# Patient Record
Sex: FEMALE | NOT HISPANIC OR LATINO | Employment: UNEMPLOYED | URBAN - NONMETROPOLITAN AREA
[De-identification: names, ages, dates, MRNs, and addresses within clinical notes are randomized per-mention and may not be internally consistent; named-entity substitution may affect disease eponyms.]

---

## 2018-05-04 ENCOUNTER — HOSPITAL ENCOUNTER (EMERGENCY)
Facility: HOSPITAL | Age: 38
Discharge: HOME | End: 2018-05-04
Attending: EMERGENCY MEDICINE | Admitting: EMERGENCY MEDICINE

## 2018-05-04 ENCOUNTER — APPOINTMENT (OUTPATIENT)
Dept: GENERAL RADIOLOGY | Facility: HOSPITAL | Age: 38
End: 2018-05-04

## 2018-05-04 ENCOUNTER — APPOINTMENT (OUTPATIENT)
Dept: ULTRASOUND IMAGING | Facility: HOSPITAL | Age: 38
End: 2018-05-04

## 2018-05-04 VITALS
DIASTOLIC BLOOD PRESSURE: 75 MMHG | HEART RATE: 65 BPM | HEIGHT: 60 IN | OXYGEN SATURATION: 98 % | TEMPERATURE: 97.9 F | SYSTOLIC BLOOD PRESSURE: 120 MMHG | WEIGHT: 210.4 LBS | BODY MASS INDEX: 41.31 KG/M2 | RESPIRATION RATE: 18 BRPM

## 2018-05-04 DIAGNOSIS — M79.601 RIGHT ARM PAIN: Primary | ICD-10-CM

## 2018-05-04 LAB
ALBUMIN SERPL-MCNC: 4.4 G/DL (ref 3.5–5)
ALBUMIN/GLOB SERPL: 1.1 G/DL (ref 1–2)
ALP SERPL-CCNC: 104 U/L (ref 38–126)
ALT SERPL W P-5'-P-CCNC: 64 U/L (ref 13–69)
AMPHET+METHAMPHET UR QL: NEGATIVE
AMPHETAMINES UR QL: NEGATIVE
ANION GAP SERPL CALCULATED.3IONS-SCNC: 19.7 MMOL/L (ref 10–20)
AST SERPL-CCNC: 45 U/L (ref 15–46)
BACTERIA UR QL AUTO: ABNORMAL /HPF
BARBITURATES UR QL SCN: NEGATIVE
BASOPHILS # BLD AUTO: 0.04 10*3/MM3 (ref 0–0.2)
BASOPHILS NFR BLD AUTO: 0.3 % (ref 0–2.5)
BENZODIAZ UR QL SCN: NEGATIVE
BILIRUB SERPL-MCNC: 1.2 MG/DL (ref 0.2–1.3)
BILIRUB UR QL STRIP: NEGATIVE
BUN BLD-MCNC: 14 MG/DL (ref 7–20)
BUN/CREAT SERPL: 28 (ref 7.1–23.5)
BUPRENORPHINE SERPL-MCNC: NEGATIVE NG/ML
CALCIUM SPEC-SCNC: 8.8 MG/DL (ref 8.4–10.2)
CANNABINOIDS SERPL QL: NEGATIVE
CHLORIDE SERPL-SCNC: 102 MMOL/L (ref 98–107)
CLARITY UR: ABNORMAL
CO2 SERPL-SCNC: 23 MMOL/L (ref 26–30)
COCAINE UR QL: NEGATIVE
COLOR UR: YELLOW
CREAT BLD-MCNC: 0.5 MG/DL (ref 0.6–1.3)
CRP SERPL-MCNC: 3.9 MG/DL (ref 0–1)
DEPRECATED RDW RBC AUTO: 42.8 FL (ref 37–54)
EOSINOPHIL # BLD AUTO: 0.02 10*3/MM3 (ref 0–0.7)
EOSINOPHIL NFR BLD AUTO: 0.2 % (ref 0–7)
ERYTHROCYTE [DISTWIDTH] IN BLOOD BY AUTOMATED COUNT: 13.9 % (ref 11.5–14.5)
ERYTHROCYTE [SEDIMENTATION RATE] IN BLOOD: 58 MM/HR (ref 0–20)
GFR SERPL CREATININE-BSD FRML MDRD: 140 ML/MIN/1.73
GFR SERPL CREATININE-BSD FRML MDRD: >150 ML/MIN/1.73
GLOBULIN UR ELPH-MCNC: 3.9 GM/DL
GLUCOSE BLD-MCNC: 104 MG/DL (ref 74–98)
GLUCOSE UR STRIP-MCNC: NEGATIVE MG/DL
HCT VFR BLD AUTO: 37.8 % (ref 37–47)
HGB BLD-MCNC: 12.7 G/DL (ref 12–16)
HGB UR QL STRIP.AUTO: ABNORMAL
HYALINE CASTS UR QL AUTO: ABNORMAL /LPF
IMM GRANULOCYTES # BLD: 0.03 10*3/MM3 (ref 0–0.06)
IMM GRANULOCYTES NFR BLD: 0.2 % (ref 0–0.6)
KETONES UR QL STRIP: ABNORMAL
LEUKOCYTE ESTERASE UR QL STRIP.AUTO: NEGATIVE
LYMPHOCYTES # BLD AUTO: 2.02 10*3/MM3 (ref 0.6–3.4)
LYMPHOCYTES NFR BLD AUTO: 16.1 % (ref 10–50)
MCH RBC QN AUTO: 28.3 PG (ref 27–31)
MCHC RBC AUTO-ENTMCNC: 33.6 G/DL (ref 30–37)
MCV RBC AUTO: 84.2 FL (ref 81–99)
METHADONE UR QL SCN: NEGATIVE
MONOCYTES # BLD AUTO: 0.73 10*3/MM3 (ref 0–0.9)
MONOCYTES NFR BLD AUTO: 5.8 % (ref 0–12)
NEUTROPHILS # BLD AUTO: 9.69 10*3/MM3 (ref 2–6.9)
NEUTROPHILS NFR BLD AUTO: 77.4 % (ref 37–80)
NITRITE UR QL STRIP: NEGATIVE
NRBC BLD MANUAL-RTO: 0 /100 WBC (ref 0–0)
OPIATES UR QL: NEGATIVE
OXYCODONE UR QL SCN: NEGATIVE
PCP UR QL SCN: NEGATIVE
PH UR STRIP.AUTO: 6 [PH] (ref 5–8)
PLATELET # BLD AUTO: 303 10*3/MM3 (ref 130–400)
PMV BLD AUTO: 9 FL (ref 6–12)
POTASSIUM BLD-SCNC: 3.7 MMOL/L (ref 3.5–5.1)
PROPOXYPH UR QL: NEGATIVE
PROT SERPL-MCNC: 8.3 G/DL (ref 6.3–8.2)
PROT UR QL STRIP: NEGATIVE
RBC # BLD AUTO: 4.49 10*6/MM3 (ref 4.2–5.4)
RBC # UR: ABNORMAL /HPF
REF LAB TEST METHOD: ABNORMAL
SODIUM BLD-SCNC: 141 MMOL/L (ref 137–145)
SP GR UR STRIP: 1.02 (ref 1–1.03)
SQUAMOUS #/AREA URNS HPF: ABNORMAL /HPF
TRICYCLICS UR QL SCN: NEGATIVE
URATE SERPL-MCNC: 5 MG/DL (ref 2.5–8.5)
UROBILINOGEN UR QL STRIP: ABNORMAL
WBC NRBC COR # BLD: 12.53 10*3/MM3 (ref 4.8–10.8)
WBC UR QL AUTO: ABNORMAL /HPF

## 2018-05-04 PROCEDURE — 85025 COMPLETE CBC W/AUTO DIFF WBC: CPT | Performed by: PHYSICIAN ASSISTANT

## 2018-05-04 PROCEDURE — 99284 EMERGENCY DEPT VISIT MOD MDM: CPT

## 2018-05-04 PROCEDURE — 87086 URINE CULTURE/COLONY COUNT: CPT | Performed by: PHYSICIAN ASSISTANT

## 2018-05-04 PROCEDURE — 81001 URINALYSIS AUTO W/SCOPE: CPT | Performed by: PHYSICIAN ASSISTANT

## 2018-05-04 PROCEDURE — 73080 X-RAY EXAM OF ELBOW: CPT

## 2018-05-04 PROCEDURE — 80306 DRUG TEST PRSMV INSTRMNT: CPT | Performed by: PHYSICIAN ASSISTANT

## 2018-05-04 PROCEDURE — 86140 C-REACTIVE PROTEIN: CPT | Performed by: PHYSICIAN ASSISTANT

## 2018-05-04 PROCEDURE — 25010000002 KETOROLAC TROMETHAMINE PER 15 MG: Performed by: PHYSICIAN ASSISTANT

## 2018-05-04 PROCEDURE — 85651 RBC SED RATE NONAUTOMATED: CPT | Performed by: PHYSICIAN ASSISTANT

## 2018-05-04 PROCEDURE — 93971 EXTREMITY STUDY: CPT

## 2018-05-04 PROCEDURE — 96372 THER/PROPH/DIAG INJ SC/IM: CPT

## 2018-05-04 PROCEDURE — 80053 COMPREHEN METABOLIC PANEL: CPT | Performed by: PHYSICIAN ASSISTANT

## 2018-05-04 PROCEDURE — 84550 ASSAY OF BLOOD/URIC ACID: CPT | Performed by: PHYSICIAN ASSISTANT

## 2018-05-04 RX ORDER — KETOROLAC TROMETHAMINE 30 MG/ML
60 INJECTION, SOLUTION INTRAMUSCULAR; INTRAVENOUS ONCE
Status: COMPLETED | OUTPATIENT
Start: 2018-05-04 | End: 2018-05-04

## 2018-05-04 RX ORDER — METHYLPREDNISOLONE SODIUM SUCCINATE 125 MG/2ML
125 INJECTION, POWDER, LYOPHILIZED, FOR SOLUTION INTRAMUSCULAR; INTRAVENOUS ONCE
Status: DISCONTINUED | OUTPATIENT
Start: 2018-05-04 | End: 2018-05-04

## 2018-05-04 RX ORDER — PREDNISONE 20 MG/1
20 TABLET ORAL DAILY
Qty: 5 TABLET | Refills: 0 | Status: SHIPPED | OUTPATIENT
Start: 2018-05-04 | End: 2022-03-30

## 2018-05-04 RX ORDER — ORPHENADRINE CITRATE 100 MG/1
100 TABLET, EXTENDED RELEASE ORAL 2 TIMES DAILY PRN
Qty: 10 TABLET | Refills: 0 | Status: SHIPPED | OUTPATIENT
Start: 2018-05-04 | End: 2022-03-30

## 2018-05-04 RX ADMIN — KETOROLAC TROMETHAMINE 60 MG: 30 INJECTION, SOLUTION INTRAMUSCULAR at 19:02

## 2018-05-04 NOTE — ED PROVIDER NOTES
Subjective   This is a 37-year-old female comes in with chief complaint of right upper extremity pain, swelling.  Patient denies any recent injury or trauma.  Patient was sent up by her primary care physician to have further evaluation and treatment.  Patient complains of aching, throbbing pain starts in her right elbow and radiates up to her upper arm.  Patient denies an previous injury.  Patient denies any associated chest pain, shortness of breath.          History provided by:  Patient   used: No    Upper Extremity Issue   Location:  Arm  Arm location:  R upper arm and R arm  Injury: no    Pain details:     Quality:  Aching and throbbing    Radiates to:  Does not radiate    Severity:  Moderate    Onset quality:  Sudden    Duration:  1 day    Timing:  Intermittent    Progression:  Worsening  Dislocation: no    Tetanus status:  Unknown  Prior injury to area:  No  Relieved by:  Nothing  Worsened by:  Nothing  Ineffective treatments:  None tried  Associated symptoms: swelling    Associated symptoms: no back pain, no decreased range of motion and no fatigue    Risk factors: no concern for non-accidental trauma and no frequent fractures        Review of Systems   Constitutional: Negative for diaphoresis and fatigue.   Respiratory: Negative for chest tightness.    Musculoskeletal: Positive for arthralgias, joint swelling and myalgias. Negative for back pain.   All other systems reviewed and are negative.      History reviewed. No pertinent past medical history.    No Known Allergies    History reviewed. No pertinent surgical history.    History reviewed. No pertinent family history.    Social History     Social History   • Marital status: Single     Social History Main Topics   • Smoking status: Never Smoker   • Alcohol use No   • Drug use: No     Other Topics Concern   • Not on file           Objective   Physical Exam   Constitutional: She is oriented to person, place, and time. She appears  well-developed and well-nourished.   HENT:   Head: Normocephalic.   Right Ear: External ear normal.   Left Ear: External ear normal.   Nose: Nose normal.   Mouth/Throat: Oropharynx is clear and moist.   Eyes: Conjunctivae and EOM are normal. Pupils are equal, round, and reactive to light.   Neck: Normal range of motion. Neck supple.   Cardiovascular: Normal rate, regular rhythm, normal heart sounds and intact distal pulses.  Exam reveals no friction rub.    No murmur heard.  Pulmonary/Chest: Effort normal and breath sounds normal. No respiratory distress. She has no wheezes.   Abdominal: Soft. Bowel sounds are normal. She exhibits no distension. There is no tenderness.   Musculoskeletal: She exhibits edema and tenderness.        Right forearm: She exhibits tenderness, swelling and edema.   Neurological: She is alert and oriented to person, place, and time. She has normal reflexes.   Skin: Skin is warm and dry. Capillary refill takes less than 2 seconds.   Psychiatric: She has a normal mood and affect. Her behavior is normal. Judgment and thought content normal.   Nursing note and vitals reviewed.      Procedures           ED Course  ED Course   Comment By Time   Discussed care with patient. Patient does state that she feels better at this time. Will treat patient will oral steroids, anti-inflammatory. Advised to return if condition worsens.  Pelon Constantino PA-C 05/04 1926                  [facility]      Final diagnoses:   Right arm pain            Pelon Constantino PA-C  05/04/18 [id number]

## 2018-05-04 NOTE — ED NOTES
Spoke with Gabriel () [id number].  Patient reports of pain that began last night. Pain and swelling worse since this am.  Went to [facility] and saw Dr. Morris and was instructed to come here for evaluation.       Gladis Mary RN  05/04/18 [id number]

## 2018-05-06 LAB — BACTERIA SPEC AEROBE CULT: NORMAL

## 2020-10-13 ENCOUNTER — TRANSCRIBE ORDERS (OUTPATIENT)
Dept: ADMINISTRATIVE | Facility: HOSPITAL | Age: 40
End: 2020-10-13

## 2020-10-13 DIAGNOSIS — M54.9 DORSALGIA: Primary | ICD-10-CM

## 2020-10-16 ENCOUNTER — HOSPITAL ENCOUNTER (OUTPATIENT)
Dept: CT IMAGING | Facility: HOSPITAL | Age: 40
Discharge: HOME | End: 2020-10-16
Admitting: NURSE PRACTITIONER

## 2020-10-16 DIAGNOSIS — M54.9 DORSALGIA: ICD-10-CM

## 2020-10-16 PROCEDURE — 74176 CT ABD & PELVIS W/O CONTRAST: CPT

## 2020-10-27 ENCOUNTER — TRANSCRIBE ORDERS (OUTPATIENT)
Dept: ADMINISTRATIVE | Facility: HOSPITAL | Age: 40
End: 2020-10-27

## 2020-10-27 DIAGNOSIS — N28.89 LEFT KIDNEY MASS: Primary | ICD-10-CM

## 2020-11-10 ENCOUNTER — HOSPITAL ENCOUNTER (OUTPATIENT)
Dept: CT IMAGING | Facility: HOSPITAL | Age: 40
Discharge: HOME | End: 2020-11-10
Admitting: NURSE PRACTITIONER

## 2020-11-10 DIAGNOSIS — N28.89 LEFT KIDNEY MASS: ICD-10-CM

## 2020-11-10 LAB — CREAT BLDA-MCNC: 0.5 MG/DL (ref 0.6–1.3)

## 2020-11-10 PROCEDURE — 74178 CT ABD&PLV WO CNTR FLWD CNTR: CPT

## 2020-11-10 PROCEDURE — 82565 ASSAY OF CREATININE: CPT

## 2020-11-10 PROCEDURE — 25010000002 IOPAMIDOL 61 % SOLUTION: Performed by: NURSE PRACTITIONER

## 2020-11-10 RX ADMIN — IOPAMIDOL 100 ML: 612 INJECTION, SOLUTION INTRAVENOUS at 09:23

## 2021-01-23 ENCOUNTER — APPOINTMENT (OUTPATIENT)
Dept: CT IMAGING | Facility: HOSPITAL | Age: 41
End: 2021-01-23

## 2021-01-23 ENCOUNTER — HOSPITAL ENCOUNTER (EMERGENCY)
Facility: HOSPITAL | Age: 41
Discharge: HOME | End: 2021-01-23
Attending: EMERGENCY MEDICINE | Admitting: EMERGENCY MEDICINE

## 2021-01-23 VITALS
RESPIRATION RATE: 18 BRPM | OXYGEN SATURATION: 95 % | BODY MASS INDEX: 39.05 KG/M2 | SYSTOLIC BLOOD PRESSURE: 118 MMHG | TEMPERATURE: 100.5 F | HEIGHT: 63 IN | WEIGHT: 220.4 LBS | HEART RATE: 78 BPM | DIASTOLIC BLOOD PRESSURE: 76 MMHG

## 2021-01-23 DIAGNOSIS — U07.1 COVID-19: Primary | ICD-10-CM

## 2021-01-23 DIAGNOSIS — U07.1 PNEUMONIA DUE TO COVID-19 VIRUS: ICD-10-CM

## 2021-01-23 DIAGNOSIS — J12.82 PNEUMONIA DUE TO COVID-19 VIRUS: ICD-10-CM

## 2021-01-23 DIAGNOSIS — R10.31 RIGHT LOWER QUADRANT ABDOMINAL PAIN: ICD-10-CM

## 2021-01-23 LAB
ALBUMIN SERPL-MCNC: 4.1 G/DL (ref 3.5–5.2)
ALBUMIN/GLOB SERPL: 1.1 G/DL
ALP SERPL-CCNC: 100 U/L (ref 39–117)
ALT SERPL W P-5'-P-CCNC: 77 U/L (ref 1–33)
ANION GAP SERPL CALCULATED.3IONS-SCNC: 10.6 MMOL/L (ref 5–15)
AST SERPL-CCNC: 69 U/L (ref 1–32)
B-HCG UR QL: NEGATIVE
BASOPHILS # BLD AUTO: 0.02 10*3/MM3 (ref 0–0.2)
BASOPHILS NFR BLD AUTO: 0.3 % (ref 0–1.5)
BILIRUB SERPL-MCNC: 0.4 MG/DL (ref 0–1.2)
BILIRUB UR QL STRIP: NEGATIVE
BUN SERPL-MCNC: 7 MG/DL (ref 6–20)
BUN/CREAT SERPL: 10.6 (ref 7–25)
CALCIUM SPEC-SCNC: 8.6 MG/DL (ref 8.6–10.5)
CHLORIDE SERPL-SCNC: 99 MMOL/L (ref 98–107)
CLARITY UR: CLEAR
CO2 SERPL-SCNC: 25.4 MMOL/L (ref 22–29)
COLOR UR: YELLOW
CREAT SERPL-MCNC: 0.66 MG/DL (ref 0.57–1)
DEPRECATED RDW RBC AUTO: 40.5 FL (ref 37–54)
EOSINOPHIL # BLD AUTO: 0.04 10*3/MM3 (ref 0–0.4)
EOSINOPHIL NFR BLD AUTO: 0.6 % (ref 0.3–6.2)
ERYTHROCYTE [DISTWIDTH] IN BLOOD BY AUTOMATED COUNT: 13.3 % (ref 12.3–15.4)
GFR SERPL CREATININE-BSD FRML MDRD: 100 ML/MIN/1.73
GFR SERPL CREATININE-BSD FRML MDRD: 121 ML/MIN/1.73
GLOBULIN UR ELPH-MCNC: 3.7 GM/DL
GLUCOSE SERPL-MCNC: 125 MG/DL (ref 65–99)
GLUCOSE UR STRIP-MCNC: NEGATIVE MG/DL
HCT VFR BLD AUTO: 43.9 % (ref 34–46.6)
HGB BLD-MCNC: 14.5 G/DL (ref 12–15.9)
HGB UR QL STRIP.AUTO: NEGATIVE
HOLD SPECIMEN: NORMAL
HOLD SPECIMEN: NORMAL
IMM GRANULOCYTES # BLD AUTO: 0.02 10*3/MM3 (ref 0–0.05)
IMM GRANULOCYTES NFR BLD AUTO: 0.3 % (ref 0–0.5)
KETONES UR QL STRIP: NEGATIVE
LEUKOCYTE ESTERASE UR QL STRIP.AUTO: NEGATIVE
LIPASE SERPL-CCNC: 18 U/L (ref 13–60)
LYMPHOCYTES # BLD AUTO: 2.09 10*3/MM3 (ref 0.7–3.1)
LYMPHOCYTES NFR BLD AUTO: 31.7 % (ref 19.6–45.3)
MCH RBC QN AUTO: 27.4 PG (ref 26.6–33)
MCHC RBC AUTO-ENTMCNC: 33 G/DL (ref 31.5–35.7)
MCV RBC AUTO: 83 FL (ref 79–97)
MONOCYTES # BLD AUTO: 0.57 10*3/MM3 (ref 0.1–0.9)
MONOCYTES NFR BLD AUTO: 8.6 % (ref 5–12)
NEUTROPHILS NFR BLD AUTO: 3.85 10*3/MM3 (ref 1.7–7)
NEUTROPHILS NFR BLD AUTO: 58.5 % (ref 42.7–76)
NITRITE UR QL STRIP: NEGATIVE
NRBC BLD AUTO-RTO: 0 /100 WBC (ref 0–0.2)
PH UR STRIP.AUTO: 6.5 [PH] (ref 5–8)
PLATELET # BLD AUTO: 219 10*3/MM3 (ref 140–450)
PMV BLD AUTO: 9 FL (ref 6–12)
POTASSIUM SERPL-SCNC: 3.5 MMOL/L (ref 3.5–5.2)
PROT SERPL-MCNC: 7.8 G/DL (ref 6–8.5)
PROT UR QL STRIP: NEGATIVE
RBC # BLD AUTO: 5.29 10*6/MM3 (ref 3.77–5.28)
SODIUM SERPL-SCNC: 135 MMOL/L (ref 136–145)
SP GR UR STRIP: 1.01 (ref 1–1.03)
UROBILINOGEN UR QL STRIP: NORMAL
WBC # BLD AUTO: 6.59 10*3/MM3 (ref 3.4–10.8)
WHOLE BLOOD HOLD SPECIMEN: NORMAL
WHOLE BLOOD HOLD SPECIMEN: NORMAL

## 2021-01-23 PROCEDURE — 25010000002 ONDANSETRON PER 1 MG: Performed by: EMERGENCY MEDICINE

## 2021-01-23 PROCEDURE — 83690 ASSAY OF LIPASE: CPT | Performed by: EMERGENCY MEDICINE

## 2021-01-23 PROCEDURE — 80053 COMPREHEN METABOLIC PANEL: CPT | Performed by: EMERGENCY MEDICINE

## 2021-01-23 PROCEDURE — 25010000002 IOPAMIDOL 61 % SOLUTION: Performed by: EMERGENCY MEDICINE

## 2021-01-23 PROCEDURE — 85025 COMPLETE CBC W/AUTO DIFF WBC: CPT | Performed by: EMERGENCY MEDICINE

## 2021-01-23 PROCEDURE — 81003 URINALYSIS AUTO W/O SCOPE: CPT | Performed by: EMERGENCY MEDICINE

## 2021-01-23 PROCEDURE — 74177 CT ABD & PELVIS W/CONTRAST: CPT

## 2021-01-23 PROCEDURE — 81025 URINE PREGNANCY TEST: CPT | Performed by: EMERGENCY MEDICINE

## 2021-01-23 PROCEDURE — 99283 EMERGENCY DEPT VISIT LOW MDM: CPT

## 2021-01-23 PROCEDURE — 25010000002 KETOROLAC TROMETHAMINE PER 15 MG: Performed by: EMERGENCY MEDICINE

## 2021-01-23 PROCEDURE — 96375 TX/PRO/DX INJ NEW DRUG ADDON: CPT

## 2021-01-23 PROCEDURE — 96374 THER/PROPH/DIAG INJ IV PUSH: CPT

## 2021-01-23 RX ORDER — ONDANSETRON 4 MG/1
4 TABLET, ORALLY DISINTEGRATING ORAL EVERY 8 HOURS PRN
Qty: 12 TABLET | Refills: 0 | Status: SHIPPED | OUTPATIENT
Start: 2021-01-23 | End: 2021-01-23 | Stop reason: SDUPTHER

## 2021-01-23 RX ORDER — AZITHROMYCIN 250 MG/1
TABLET, FILM COATED ORAL
Qty: 6 TABLET | Refills: 0 | Status: SHIPPED | OUTPATIENT
Start: 2021-01-23 | End: 2022-03-30

## 2021-01-23 RX ORDER — SODIUM CHLORIDE 0.9 % (FLUSH) 0.9 %
10 SYRINGE (ML) INJECTION AS NEEDED
Status: DISCONTINUED | OUTPATIENT
Start: 2021-01-23 | End: 2021-01-23 | Stop reason: HOSPADM

## 2021-01-23 RX ORDER — ONDANSETRON 4 MG/1
4 TABLET, ORALLY DISINTEGRATING ORAL EVERY 8 HOURS PRN
Qty: 12 TABLET | Refills: 0 | Status: SHIPPED | OUTPATIENT
Start: 2021-01-23 | End: 2022-03-30

## 2021-01-23 RX ORDER — ONDANSETRON 2 MG/ML
4 INJECTION INTRAMUSCULAR; INTRAVENOUS ONCE
Status: COMPLETED | OUTPATIENT
Start: 2021-01-23 | End: 2021-01-23

## 2021-01-23 RX ORDER — AZITHROMYCIN 250 MG/1
TABLET, FILM COATED ORAL
Qty: 6 TABLET | Refills: 0 | Status: SHIPPED | OUTPATIENT
Start: 2021-01-23 | End: 2021-01-23 | Stop reason: SDUPTHER

## 2021-01-23 RX ORDER — KETOROLAC TROMETHAMINE 30 MG/ML
30 INJECTION, SOLUTION INTRAMUSCULAR; INTRAVENOUS EVERY 6 HOURS PRN
Status: DISCONTINUED | OUTPATIENT
Start: 2021-01-23 | End: 2021-01-23 | Stop reason: HOSPADM

## 2021-01-23 RX ORDER — PREDNISONE 20 MG/1
TABLET ORAL
Qty: 10 TABLET | Refills: 0 | Status: SHIPPED | OUTPATIENT
Start: 2021-01-23 | End: 2021-01-23 | Stop reason: SDUPTHER

## 2021-01-23 RX ORDER — PREDNISONE 20 MG/1
TABLET ORAL
Qty: 10 TABLET | Refills: 0 | Status: SHIPPED | OUTPATIENT
Start: 2021-01-23 | End: 2022-03-30

## 2021-01-23 RX ADMIN — ONDANSETRON 4 MG: 2 INJECTION INTRAMUSCULAR; INTRAVENOUS at 01:18

## 2021-01-23 RX ADMIN — KETOROLAC TROMETHAMINE 30 MG: 30 INJECTION, SOLUTION INTRAMUSCULAR at 01:19

## 2021-01-23 RX ADMIN — IOPAMIDOL 100 ML: 612 INJECTION, SOLUTION INTRAVENOUS at 01:51

## 2021-03-04 ENCOUNTER — OFFICE VISIT (OUTPATIENT)
Dept: UROLOGY | Facility: CLINIC | Age: 41
End: 2021-03-04

## 2021-03-04 VITALS
HEART RATE: 91 BPM | WEIGHT: 220 LBS | HEIGHT: 63 IN | BODY MASS INDEX: 38.98 KG/M2 | OXYGEN SATURATION: 97 % | DIASTOLIC BLOOD PRESSURE: 90 MMHG | SYSTOLIC BLOOD PRESSURE: 124 MMHG | RESPIRATION RATE: 18 BRPM

## 2021-03-04 DIAGNOSIS — N28.1 RENAL CYST: Primary | ICD-10-CM

## 2021-03-04 LAB
BILIRUB BLD-MCNC: NEGATIVE MG/DL
CLARITY, POC: CLEAR
COLOR UR: YELLOW
GLUCOSE UR STRIP-MCNC: NEGATIVE MG/DL
KETONES UR QL: NEGATIVE
LEUKOCYTE EST, POC: NEGATIVE
NITRITE UR-MCNC: NEGATIVE MG/ML
PH UR: 7 [PH] (ref 5–8)
PROT UR STRIP-MCNC: NEGATIVE MG/DL
RBC # UR STRIP: NEGATIVE /UL
SP GR UR: 1.02 (ref 1–1.03)
UROBILINOGEN UR QL: NORMAL

## 2021-03-04 PROCEDURE — 99203 OFFICE O/P NEW LOW 30 MIN: CPT | Performed by: UROLOGY

## 2021-03-04 NOTE — PROGRESS NOTES
Chief Complaint  No chief complaint on file.    Referring Provider:  Shiva Neumann MD, FASN    HPI  Ms. Forte is a 40 y.o. female with below past medical history who presents with abnormal CT imaging. She recently presented to the emergency department on 1/23/2021 with a several day history of intermittent, severe, and very bothersome left lower quadrant pain. It was not associated with fevers, flank pain, or gross hematuria. She denies a history of nephrolithiasis. She was incidentally found to have a cyst on her left kidney and presents today for consultation regarding that cyst.    The conversation was held primarily in Marshallese with the additional aid of a .    Past Medical History  No past medical history on file.    Past Surgical History  No past surgical history on file.    Medications    Current Outpatient Medications:   •  azithromycin (ZITHROMAX) 250 MG tablet, Take 2 tablets the first day, then 1 tablet daily for 4 days., Disp: 6 tablet, Rfl: 0  •  ondansetron ODT (ZOFRAN-ODT) 4 MG disintegrating tablet, Place 1 tablet on the tongue Every 8 (Eight) Hours As Needed for Nausea., Disp: 12 tablet, Rfl: 0  •  orphenadrine (NORFLEX) 100 MG 12 hr tablet, Take 1 tablet by mouth 2 (Two) Times a Day As Needed for Muscle Spasms., Disp: 10 tablet, Rfl: 0  •  predniSONE (DELTASONE) 20 MG tablet, Take 1 tablet by mouth Daily., Disp: 5 tablet, Rfl: 0  •  predniSONE (DELTASONE) 20 MG tablet, Take 2 tablets daily., Disp: 10 tablet, Rfl: 0    Allergies  No Known Allergies    Social History  Social History     Socioeconomic History   • Marital status: Single     Spouse name: Not on file   • Number of children: Not on file   • Years of education: Not on file   • Highest education level: Not on file   Tobacco Use   • Smoking status: Never Smoker   • Smokeless tobacco: Never Used   Substance and Sexual Activity   • Alcohol use: No   • Drug use: No       Family History  She has no family history of  "renal cell cancer.    Review of Systems  Review of systems was notable for resolved left lower quadrant pain.    Physical Exam  Visit Vitals  /90   Pulse 91   Resp 18   Ht 160 cm (62.99\")   Wt 99.8 kg (220 lb)   SpO2 97%   BMI 38.98 kg/m²     Physical exam was notable for obesity and no CVA tenderness.    Labs  Brief Urine Lab Results  (Last result in the past 365 days)      Color   Clarity   Blood   Leuk Est   Nitrite   Protein   CREAT   Urine HCG        03/04/21 1518 Yellow Clear Negative Negative Negative Negative                    Lab Results   Component Value Date    GLUCOSE 125 (H) 01/23/2021    CALCIUM 8.6 01/23/2021     (L) 01/23/2021    K 3.5 01/23/2021    CO2 25.4 01/23/2021    CL 99 01/23/2021    BUN 7 01/23/2021    CREATININE 0.66 01/23/2021    EGFRIFAFRI 121 01/23/2021    EGFRIFNONA 100 01/23/2021    BCR 10.6 01/23/2021    ANIONGAP 10.6 01/23/2021       Lab Results   Component Value Date    WBC 6.59 01/23/2021    HGB 14.5 01/23/2021    HCT 43.9 01/23/2021    MCV 83.0 01/23/2021     01/23/2021         Radiographic Studies  Ct Abdomen Pelvis With & Without Contrast  Result Date: 11/10/2020  1. Exam is degraded by artifact from patient motion. 2. Left renal lesion is somewhat difficult to definitively characterize due to the patient motion artifact. There is only questionable mild enhancement. This may represent a complex cyst. Other neoplastic process difficult to definitively exclude. Urologic consultation is recommended for appropriate continued follow-up. This could be further characterized with MRI or a short-term repeat follow-up renal mass protocol CT within 3-4 months.  This report was finalized on 11/10/2020 10:52 AM by Clark Ellis MD.    Ct Abdomen Pelvis With Contrast  Result Date: 1/23/2021  Impression: 1.  No acute appendicitis or other acute inflammatory process. 2.  No acute obstructive uropathy.  Probable 4 cm hyperdense left renal cyst.  Follow-up renal ultrasound or " dedicated renal CT could be performed on a nonurgent elective basis. 3.  Multiple pulmonary opacities in the included lung fields most suggestive of infectious process.  Recommend clinical correlation. 4.  No bowel obstruction or ascites. 5.  Probable 2 cm left ovarian dominant follicle/small cyst. Authenticated by Candice Virk DO on 01/23/2021 02:23:14 AM        Assessment  Ms. Forte is a 40 y.o. female with a likely proteinaceous/complex renal cyst on her left kidney. While there appears to be hyper-dense material within this cyst on non-contrast imaging, it overall does not seem to enhance and therefore is likely benign. We will continue to follow it with repeat imaging in 6 months. This is a new diagnosis of uncertain clinical significance at this point.    Plan  1. Follow up in 6 months with CT renal mass protocol imaging.    Scribed for Romel Broussard MD by DANNY PLAZA.  3/5/2021  12:05 EST    ALEAXNDRA Broussard MD have personally performed the services described in this document as scribed by the above individual, and it is both accurate and complete.     Romel Broussard MD  3/8/2021  07:40 EST      Romel Broussard MD

## 2021-09-15 ENCOUNTER — HOSPITAL ENCOUNTER (OUTPATIENT)
Dept: CT IMAGING | Facility: HOSPITAL | Age: 41
Discharge: HOME | End: 2021-09-15
Admitting: UROLOGY

## 2021-09-15 DIAGNOSIS — N28.1 RENAL CYST: ICD-10-CM

## 2021-09-15 PROCEDURE — 25010000002 IOPAMIDOL 61 % SOLUTION: Performed by: UROLOGY

## 2021-09-15 PROCEDURE — 74170 CT ABD WO CNTRST FLWD CNTRST: CPT

## 2021-09-15 RX ADMIN — IOPAMIDOL 100 ML: 612 INJECTION, SOLUTION INTRAVENOUS at 11:52

## 2021-09-24 ENCOUNTER — OFFICE VISIT (OUTPATIENT)
Dept: UROLOGY | Facility: CLINIC | Age: 41
End: 2021-09-24

## 2021-09-24 VITALS
HEIGHT: 63 IN | TEMPERATURE: 97.1 F | WEIGHT: 220 LBS | BODY MASS INDEX: 38.98 KG/M2 | DIASTOLIC BLOOD PRESSURE: 68 MMHG | HEART RATE: 61 BPM | SYSTOLIC BLOOD PRESSURE: 110 MMHG | OXYGEN SATURATION: 97 %

## 2021-09-24 DIAGNOSIS — N28.1 RENAL CYST: Primary | ICD-10-CM

## 2021-09-24 PROBLEM — N28.89 RENAL MASS: Status: ACTIVE | Noted: 2021-09-24

## 2021-09-24 PROCEDURE — 99214 OFFICE O/P EST MOD 30 MIN: CPT | Performed by: UROLOGY

## 2021-09-24 RX ORDER — SODIUM CHLORIDE, SODIUM LACTATE, POTASSIUM CHLORIDE, CALCIUM CHLORIDE 600; 310; 30; 20 MG/100ML; MG/100ML; MG/100ML; MG/100ML
100 INJECTION, SOLUTION INTRAVENOUS CONTINUOUS
Status: CANCELLED | OUTPATIENT
Start: 2021-09-24

## 2021-09-24 NOTE — PROGRESS NOTES
"Chief Complaint   Patient presents with   • Follow-up     6 Month follow up for CT scan results     HPI  Ms. Verma is a 41 y.o. female with history below in assessment, who presents for follow up.     At this visit she has no flank pain.    History reviewed. No pertinent past medical history.    History reviewed. No pertinent surgical history.      Current Outpatient Medications:   •  azithromycin (ZITHROMAX) 250 MG tablet, Take 2 tablets the first day, then 1 tablet daily for 4 days., Disp: 6 tablet, Rfl: 0  •  ondansetron ODT (ZOFRAN-ODT) 4 MG disintegrating tablet, Place 1 tablet on the tongue Every 8 (Eight) Hours As Needed for Nausea., Disp: 12 tablet, Rfl: 0  •  orphenadrine (NORFLEX) 100 MG 12 hr tablet, Take 1 tablet by mouth 2 (Two) Times a Day As Needed for Muscle Spasms., Disp: 10 tablet, Rfl: 0  •  predniSONE (DELTASONE) 20 MG tablet, Take 1 tablet by mouth Daily., Disp: 5 tablet, Rfl: 0  •  predniSONE (DELTASONE) 20 MG tablet, Take 2 tablets daily., Disp: 10 tablet, Rfl: 0     Physical Exam  Visit Vitals  /68   Pulse 61   Temp 97.1 °F (36.2 °C)   Ht 160 cm (63\")   Wt 99.8 kg (220 lb)   SpO2 97%   Breastfeeding No   BMI 38.97 kg/m²       Labs  Brief Urine Lab Results  (Last result in the past 365 days)      Color   Clarity   Blood   Leuk Est   Nitrite   Protein   CREAT   Urine HCG        03/04/21 1518 Yellow Clear Negative Negative Negative Negative               Lab Results   Component Value Date    GLUCOSE 125 (H) 01/23/2021    CALCIUM 8.6 01/23/2021     (L) 01/23/2021    K 3.5 01/23/2021    CO2 25.4 01/23/2021    CL 99 01/23/2021    BUN 7 01/23/2021    CREATININE 0.66 01/23/2021    EGFRIFAFRI 121 01/23/2021    EGFRIFNONA 100 01/23/2021    BCR 10.6 01/23/2021    ANIONGAP 10.6 01/23/2021       Lab Results   Component Value Date    WBC 6.59 01/23/2021    HGB 14.5 01/23/2021    HCT 43.9 01/23/2021    MCV 83.0 01/23/2021     01/23/2021       Radiographic Studies  CT Abdomen With " & Without Contrast  Result Date: 9/15/2021  Interval increase in size of the cystic lesion in the mid left kidney with enhancing internal septations. A cystic renal cell carcinoma is not excluded with this exam.  3423.22 mGy.cm   This study was performed with techniques to keep radiation doses as low as reasonably achievable (ALARA). Individualized dose reduction techniques using automated exposure control or adjustment of mA and/or kV according to the patient size were employed.  This report was finalized on 9/15/2021 12:16 PM by Regina Peter M.D..      I have reviewed the above labs and imaging.     Assessment  41 y.o. female with 5cm growing left renal mass. It has more characteristics that that appeared malignant as well.    We had a long discussion with the aid of the  about the different options to treat renal masses.  We discussed renal breast biopsy with its pros and cons.  I explained to her that because this is a cystic mass the chance of having an inconclusive biopsy was higher.  We discussed radical versus partial nephrectomy.  The mass is not well-circumscribed and appears to be involving the collecting system, placing her at greater operative risk.  She has few medical comorbidities other than wheezing and would tolerate loss of the kidney well.  She does not have any CKD.    Plan  1. Schedule left lap nephrectomy at [facility].  Risk factors associated with complications are obesity.

## 2021-10-14 ENCOUNTER — TRANSCRIBE ORDERS (OUTPATIENT)
Dept: ADMINISTRATIVE | Facility: HOSPITAL | Age: 41
End: 2021-10-14

## 2021-10-14 DIAGNOSIS — Z11.59 ENCOUNTER FOR SCREENING FOR VIRAL DISEASE: Primary | ICD-10-CM

## 2021-11-17 ENCOUNTER — APPOINTMENT (OUTPATIENT)
Dept: PREADMISSION TESTING | Facility: HOSPITAL | Age: 41
End: 2021-11-17

## 2021-11-22 ENCOUNTER — PRE-ADMISSION TESTING (OUTPATIENT)
Dept: PREADMISSION TESTING | Facility: HOSPITAL | Age: 41
End: 2021-11-22

## 2021-11-22 ENCOUNTER — TELEPHONE (OUTPATIENT)
Dept: UROLOGY | Facility: CLINIC | Age: 41
End: 2021-11-22

## 2021-11-22 NOTE — DISCHARGE INSTRUCTIONS
PAT PASS GIVEN/REVIEWED WITH PT.  VERBALIZED UNDERSTANDING OF THE FOLLOWING:  DO NOT EAT, DRINK, SMOKE, USE SMOKELESS TOBACCO OR CHEW GUM AFTER MIDNIGHT THE NIGHT BEFORE SURGERY.  THIS ALSO INCLUDES HARD CANDIES AND MINTS.    DO NOT SHAVE THE AREA TO BE OPERATED ON AT LEAST 48 HOURS PRIOR TO THE PROCEDURE.  DO NOT WEAR MAKE UP OR NAIL POLISH.  DO NOT LEAVE IN ANY PIERCING OR WEAR JEWELRY THE DAY OF SURGERY.      DO NOT USE ADHESIVES IF YOU WEAR DENTURES.    DO NOT WEAR EYE CONTACTS; BRING IN YOUR GLASSES.    ONLY TAKE MEDICATION THE MORNING OF YOUR PROCEDURE IF INSTRUCTED BY YOUR SURGEON WITH ENOUGH WATER TO SWALLOW THE MEDICATION.  IF YOUR SURGEON DID NOT SPECIFY WHICH MEDICATIONS TO TAKE, YOU WILL NEED TO CALL THEIR OFFICE FOR FURTHER INSTRUCTIONS AND DO AS THEY INSTRUCT.    LEAVE ANYTHING YOU CONSIDER VALUABLE AT HOME.    YOU WILL NEED TO ARRANGE FOR SOMEONE TO DRIVE YOU HOME AFTER SURGERY.  IT IS RECOMMENDED THAT YOU DO NOT DRIVE, WORK, DRINK ALCOHOL OR MAKE MAJOR DECISIONS FOR AT LEAST 24 HOURS AFTER YOUR PROCEDURE IS COMPLETE.      THE DAY OF YOUR PROCEDURE, BRING IN THE FOLLOWING IF APPLICABLE:   PICTURE ID AND INSURANCE/MEDICARE OR MEDICAID CARDS/ANY CO-PAY THAT MAY BE DUE   COPY OF ADVANCED DIRECTIVE/LIVING WILL/POWER OR    CPAP/BIPAP/INHALERS   SKIN PREP SHEET   YOUR PREADMISSION TESTING PASS (IF NOT A PHONE HISTORY)        Chlorhexidine wipes along with instruction/verification sheet given to pt.  Instructed pt to date, time, and initial the verification sheet once skin prep has been  completed, and [facility].  Pt. Verbalizes understanding.      COVID self-quarantine instructions reviewed with the pt.  Verbalized understanding.

## 2021-11-22 NOTE — PAT
Informed pt via interpretor, that before she left PAT today, that Dr. Broussard's office would follow-up with her.  Instructed her to f/u with them if needed as well PRN.  Verbalized understanding.

## 2021-11-22 NOTE — PAT
Pt here today for PAT for upcoming procedure on 12/1/21 with Dr. Broussard.  Pt has an interpretor, Marshall, from [employer], present.  Pt states that she wants to re-schedule this procedure until after January.  Pt stated that she won't have family with her until then, that can watch her child.  Confirmed information again via interpretor/pt.      Called Dr. Broussard's office and spoke with Belen Tinoco.  Asked to speak with Heather, but Belen stated that she and Dr. Broussard were both on vacation this week.  Informed regarding above information.  Belen stated that she would send Dr. Broussard a message, and let RUFUS Sanches, know.

## 2021-11-29 ENCOUNTER — APPOINTMENT (OUTPATIENT)
Dept: LAB | Facility: HOSPITAL | Age: 41
End: 2021-11-29

## 2022-01-31 ENCOUNTER — OFFICE VISIT (OUTPATIENT)
Dept: UROLOGY | Facility: CLINIC | Age: 42
End: 2022-01-31

## 2022-01-31 VITALS
OXYGEN SATURATION: 98 % | HEIGHT: 63 IN | SYSTOLIC BLOOD PRESSURE: 126 MMHG | WEIGHT: 220 LBS | TEMPERATURE: 98.1 F | HEART RATE: 82 BPM | DIASTOLIC BLOOD PRESSURE: 82 MMHG | BODY MASS INDEX: 38.98 KG/M2

## 2022-01-31 DIAGNOSIS — N28.89 RENAL MASS: Primary | ICD-10-CM

## 2022-01-31 PROCEDURE — 99215 OFFICE O/P EST HI 40 MIN: CPT | Performed by: UROLOGY

## 2022-01-31 NOTE — PROGRESS NOTES
"Chief Complaint   Patient presents with   • Follow-up     discuss rescheduling surgery      HPI  Ms. Verma is a 41 y.o. female with history below in assessment, who presents for follow up.     At this visit patient cancelled past surgery due to childcare coverage issues.  At this visit she would like to take a step back and discuss her options.    The majority of the visit was performed in [place].  We did have an  for some of the more difficult to explain technical points.    Past Medical History:   Diagnosis Date   • Lab test positive for detection of COVID-19 virus 01/2021    was tested in [facility].  got pneumonia secondary.   • Pneumonia 01/2021    secondary to COVID       Past Surgical History:   Procedure Laterality Date   • TUBAL ABDOMINAL LIGATION           Current Outpatient Medications:   •  azithromycin (ZITHROMAX) 250 MG tablet, Take 2 tablets the first day, then 1 tablet daily for 4 days., Disp: 6 tablet, Rfl: 0  •  ondansetron ODT (ZOFRAN-ODT) 4 MG disintegrating tablet, Place 1 tablet on the tongue Every 8 (Eight) Hours As Needed for Nausea., Disp: 12 tablet, Rfl: 0  •  orphenadrine (NORFLEX) 100 MG 12 hr tablet, Take 1 tablet by mouth 2 (Two) Times a Day As Needed for Muscle Spasms., Disp: 10 tablet, Rfl: 0  •  predniSONE (DELTASONE) 20 MG tablet, Take 1 tablet by mouth Daily., Disp: 5 tablet, Rfl: 0  •  predniSONE (DELTASONE) 20 MG tablet, Take 2 tablets daily., Disp: 10 tablet, Rfl: 0     Physical Exam  Visit Vitals  /82   Pulse 82   Temp 98.1 °F (36.7 °C)   Ht 160 cm (63\")   Wt 99.8 kg (220 lb)   SpO2 98%   BMI 38.97 kg/m²       Labs  Brief Urine Lab Results  (Last result in the past 365 days)      Color   Clarity   Blood   Leuk Est   Nitrite   Protein   CREAT   Urine HCG        03/04/21 1518 Yellow   Clear   Negative   Negative   Negative   Negative                 Lab Results   Component Value Date    GLUCOSE 125 (H) 01/23/2021    CALCIUM 8.6 01/23/2021     " (L) 01/23/2021    K 3.5 01/23/2021    CO2 25.4 01/23/2021    CL 99 01/23/2021    BUN 7 01/23/2021    CREATININE 0.66 01/23/2021    EGFRIFAFRI 121 01/23/2021    EGFRIFNONA 100 01/23/2021    BCR 10.6 01/23/2021    ANIONGAP 10.6 01/23/2021       Lab Results   Component Value Date    WBC 6.59 01/23/2021    HGB 14.5 01/23/2021    HCT 43.9 01/23/2021    MCV 83.0 01/23/2021     01/23/2021          Radiographic Studies  No Images in the past 120 days found..    We once again pulled up her CT imaging and looked at it in detail.    I have reviewed the above labs and imaging.     Assessment  41 y.o. female with an exophytic, interpolar, enhancing, somewhat cystic 5cm left renal mass.  This does appear to abut the collecting system.     We had a long discussion with the aid of the  about the different options to treat renal masses.  We discussed renal mass biopsy with its pros and cons.  I explained to her that because this is a cystic mass the chance of having an inconclusive biopsy was higher, however we will pursue this.  I quoted her the typical literature, which suggest that with enhancing renal masses across the board there is an 80-85% chance of malignancy, versus 15% chance that it is benign.  We discussed radical versus partial nephrectomy, weighing the pros and cons.  The mass is not well-circumscribed and appears to be involving the collecting system, placing her at greater operative risk for infection, urinary leak, blood loss.  She has few medical comorbidities other than wheezing and would tolerate loss of the kidney well.  She does not have any CKD.     Plan  1.  Schedule renal mass biopsy with interventional radiology at [facility]  2.  Referral to Dr. Nick to discuss partial nephrectomy  3.  Follow-up with me in 4 weeks to discuss her treatment decision.  If she would like radical nephrectomy, I can perform this in [place] closer to home for her, and would be happy to do so.  If she  decides on partial nephrectomy, I am sure Dr. Nick for one of his partners would be happy to provide that for her.    I spent a total of 45 minutes with the patient and the chart engaging in data gathering and interpretation, patient interaction, as well as counseling on the risks, benefits, and alternatives of the therapy and coordinating care.

## 2022-03-03 ENCOUNTER — OFFICE VISIT (OUTPATIENT)
Dept: UROLOGY | Facility: CLINIC | Age: 42
End: 2022-03-03

## 2022-03-03 VITALS
WEIGHT: 220 LBS | TEMPERATURE: 97.9 F | SYSTOLIC BLOOD PRESSURE: 122 MMHG | HEIGHT: 63 IN | BODY MASS INDEX: 38.98 KG/M2 | HEART RATE: 64 BPM | OXYGEN SATURATION: 99 % | DIASTOLIC BLOOD PRESSURE: 74 MMHG

## 2022-03-03 DIAGNOSIS — N28.89 RENAL MASS: Primary | ICD-10-CM

## 2022-03-03 PROCEDURE — 99214 OFFICE O/P EST MOD 30 MIN: CPT | Performed by: UROLOGY

## 2022-03-03 RX ORDER — SODIUM CHLORIDE 9 MG/ML
125 INJECTION, SOLUTION INTRAVENOUS CONTINUOUS
Status: CANCELLED | OUTPATIENT
Start: 2022-03-03

## 2022-03-03 NOTE — PROGRESS NOTES
"Chief Complaint   Patient presents with   • Follow-up     Pt in office for follow up on renal mass        HPI  Ms. Verma is a 41 y.o. female with history below in assessment, who presents for follow up.     At this visit patient states that she had a further discussion with her  and would like to have laparoscopic nephrectomy due to concern for complications with partial nephrectomy. Patient therefore canceled her appointment with Dr. Lozano.    Past Medical History:   Diagnosis Date   • Lab test positive for detection of COVID-19 virus 01/2021    was tested in [facility].  got pneumonia secondary.   • Pneumonia 01/2021    secondary to COVID       Past Surgical History:   Procedure Laterality Date   • TUBAL ABDOMINAL LIGATION           Current Outpatient Medications:   •  azithromycin (ZITHROMAX) 250 MG tablet, Take 2 tablets the first day, then 1 tablet daily for 4 days., Disp: 6 tablet, Rfl: 0  •  ondansetron ODT (ZOFRAN-ODT) 4 MG disintegrating tablet, Place 1 tablet on the tongue Every 8 (Eight) Hours As Needed for Nausea., Disp: 12 tablet, Rfl: 0  •  orphenadrine (NORFLEX) 100 MG 12 hr tablet, Take 1 tablet by mouth 2 (Two) Times a Day As Needed for Muscle Spasms., Disp: 10 tablet, Rfl: 0  •  predniSONE (DELTASONE) 20 MG tablet, Take 1 tablet by mouth Daily., Disp: 5 tablet, Rfl: 0  •  predniSONE (DELTASONE) 20 MG tablet, Take 2 tablets daily., Disp: 10 tablet, Rfl: 0     Physical Exam  Visit Vitals  /74   Pulse 64   Temp 97.9 °F (36.6 °C)   Ht 160 cm (63\")   Wt 99.8 kg (220 lb)   SpO2 99%   BMI 38.97 kg/m²       Labs  Brief Urine Lab Results  (Last result in the past 365 days)      Color   Clarity   Blood   Leuk Est   Nitrite   Protein   CREAT   Urine HCG        03/04/21 1518 Yellow   Clear   Negative   Negative   Negative   Negative                 Lab Results   Component Value Date    GLUCOSE 125 (H) 01/23/2021    CALCIUM 8.6 01/23/2021     (L) 01/23/2021    K 3.5 01/23/2021    " CO2 25.4 01/23/2021    CL 99 01/23/2021    BUN 7 01/23/2021    CREATININE 0.66 01/23/2021    EGFRIFAFRI 121 01/23/2021    EGFRIFNONA 100 01/23/2021    BCR 10.6 01/23/2021    ANIONGAP 10.6 01/23/2021       Lab Results   Component Value Date    WBC 6.59 01/23/2021    HGB 14.5 01/23/2021    HCT 43.9 01/23/2021    MCV 83.0 01/23/2021     01/23/2021       Radiographic Studies  No Images in the past 120 days found..    I have reviewed the above labs and imaging.     Assessment  41 y.o. female with enhancing cystic left renal mass, concerning for RCC.     We had a long discussion once again about the risks and benefits associated with laparoscopic left nephrectomy, as well as all the alternative options. She says she has made her decision after being fully informed and would like lap nephrectomy. We did discuss that there is a chance that the mass is benign.    Plan  1. Schedule left laparoscopic nephrectomy 4/13/2022    I spent a total of 35 minutes with the patient and the chart engaging in data gathering and interpretation, patient interaction, as well as counseling on the risks, benefits, and alternatives of the therapy and coordinating care.

## 2022-03-30 ENCOUNTER — PRE-ADMISSION TESTING (OUTPATIENT)
Dept: PREADMISSION TESTING | Facility: HOSPITAL | Age: 42
End: 2022-03-30

## 2022-03-30 DIAGNOSIS — N28.89 RENAL MASS: ICD-10-CM

## 2022-03-30 PROCEDURE — 80048 BASIC METABOLIC PNL TOTAL CA: CPT | Performed by: UROLOGY

## 2022-03-30 PROCEDURE — 85027 COMPLETE CBC AUTOMATED: CPT | Performed by: UROLOGY

## 2022-04-11 ENCOUNTER — LAB (OUTPATIENT)
Dept: LAB | Facility: HOSPITAL | Age: 42
End: 2022-04-11

## 2022-04-11 DIAGNOSIS — N28.89 RENAL MASS: ICD-10-CM

## 2022-04-11 LAB
ABO GROUP BLD: NORMAL
BLD GP AB SCN SERPL QL: NEGATIVE
RH BLD: POSITIVE
SARS-COV-2 RNA PNL SPEC NAA+PROBE: NOT DETECTED
T&S EXPIRATION DATE: NORMAL

## 2022-04-11 PROCEDURE — U0004 COV-19 TEST NON-CDC HGH THRU: HCPCS

## 2022-04-11 PROCEDURE — 86920 COMPATIBILITY TEST SPIN: CPT

## 2022-04-11 PROCEDURE — 36415 COLL VENOUS BLD VENIPUNCTURE: CPT

## 2022-04-11 PROCEDURE — 86850 RBC ANTIBODY SCREEN: CPT

## 2022-04-11 PROCEDURE — C9803 HOPD COVID-19 SPEC COLLECT: HCPCS

## 2022-04-11 PROCEDURE — 86900 BLOOD TYPING SEROLOGIC ABO: CPT

## 2022-04-11 PROCEDURE — 86901 BLOOD TYPING SEROLOGIC RH(D): CPT

## 2022-04-13 ENCOUNTER — ANESTHESIA (OUTPATIENT)
Dept: PERIOP | Facility: HOSPITAL | Age: 42
End: 2022-04-13

## 2022-04-13 ENCOUNTER — ANESTHESIA EVENT (OUTPATIENT)
Dept: PERIOP | Facility: HOSPITAL | Age: 42
End: 2022-04-13

## 2022-04-13 ENCOUNTER — ANESTHESIA EVENT CONVERTED (OUTPATIENT)
Dept: ANESTHESIOLOGY | Facility: HOSPITAL | Age: 42
End: 2022-04-13

## 2022-04-13 ENCOUNTER — HOSPITAL ENCOUNTER (INPATIENT)
Facility: HOSPITAL | Age: 42
LOS: 1 days | Discharge: HOME | End: 2022-04-14
Attending: UROLOGY | Admitting: UROLOGY

## 2022-04-13 DIAGNOSIS — N28.89 RENAL MASS: ICD-10-CM

## 2022-04-13 LAB
B-HCG UR QL: NEGATIVE
EXPIRATION DATE: NORMAL
INTERNAL NEGATIVE CONTROL: NORMAL
INTERNAL POSITIVE CONTROL: NORMAL
Lab: NORMAL

## 2022-04-13 PROCEDURE — C1889 IMPLANT/INSERT DEVICE, NOC: HCPCS | Performed by: UROLOGY

## 2022-04-13 PROCEDURE — 25010000002 PROPOFOL 200 MG/20ML EMULSION: Performed by: NURSE ANESTHETIST, CERTIFIED REGISTERED

## 2022-04-13 PROCEDURE — 88307 TISSUE EXAM BY PATHOLOGIST: CPT | Performed by: UROLOGY

## 2022-04-13 PROCEDURE — 81025 URINE PREGNANCY TEST: CPT | Performed by: UROLOGY

## 2022-04-13 PROCEDURE — 0 CEFAZOLIN SODIUM-DEXTROSE 2-3 GM-%(50ML) RECONSTITUTED SOLUTION

## 2022-04-13 PROCEDURE — S2900 ROBOTIC SURGICAL SYSTEM: HCPCS | Performed by: UROLOGY

## 2022-04-13 PROCEDURE — 25010000002 FENTANYL CITRATE (PF) 100 MCG/2ML SOLUTION: Performed by: NURSE ANESTHETIST, CERTIFIED REGISTERED

## 2022-04-13 PROCEDURE — 25010000002 ONDANSETRON PER 1 MG: Performed by: NURSE ANESTHETIST, CERTIFIED REGISTERED

## 2022-04-13 PROCEDURE — 25010000002 SUCCINYLCHOLINE PER 20 MG: Performed by: NURSE ANESTHETIST, CERTIFIED REGISTERED

## 2022-04-13 PROCEDURE — 25010000002 HYDROMORPHONE 1 MG/ML SOLUTION: Performed by: NURSE ANESTHETIST, CERTIFIED REGISTERED

## 2022-04-13 PROCEDURE — 0TT14ZZ RESECTION OF LEFT KIDNEY, PERCUTANEOUS ENDOSCOPIC APPROACH: ICD-10-PCS | Performed by: UROLOGY

## 2022-04-13 PROCEDURE — 0 CEFAZOLIN SODIUM-DEXTROSE 2-3 GM-%(50ML) RECONSTITUTED SOLUTION: Performed by: UROLOGY

## 2022-04-13 PROCEDURE — 50545 LAPARO RADICAL NEPHRECTOMY: CPT | Performed by: UROLOGY

## 2022-04-13 PROCEDURE — 25010000002 DEXAMETHASONE PER 1 MG: Performed by: NURSE ANESTHETIST, CERTIFIED REGISTERED

## 2022-04-13 DEVICE — FLOSEAL HEMOSTATIC MATRIX, 10ML
Type: IMPLANTABLE DEVICE | Site: ABDOMEN | Status: FUNCTIONAL
Brand: FLOSEAL HEMOSTATIC MATRIX

## 2022-04-13 DEVICE — LIGACLIP 10-M/L, 10MM ENDOSCOPIC ROTATING MULTIPLE CLIP APPLIERS
Type: IMPLANTABLE DEVICE | Site: ABDOMEN | Status: FUNCTIONAL
Brand: LIGACLIP

## 2022-04-13 DEVICE — DEV CONTRL TISS STRATAFIX SPIRAL MNCRYL UD 3/0 PLS 60CM: Type: IMPLANTABLE DEVICE | Site: ABDOMEN | Status: FUNCTIONAL

## 2022-04-13 DEVICE — ENDOPATH ECHELON ENDOSCOPIC LINEAR CUTTER RELOADS, WHITE, 60MM
Type: IMPLANTABLE DEVICE | Site: ABDOMEN | Status: FUNCTIONAL
Brand: ECHELON ENDOPATH

## 2022-04-13 RX ORDER — ROCURONIUM BROMIDE 10 MG/ML
INJECTION, SOLUTION INTRAVENOUS AS NEEDED
Status: DISCONTINUED | OUTPATIENT
Start: 2022-04-13 | End: 2022-04-13 | Stop reason: SURG

## 2022-04-13 RX ORDER — MEPERIDINE HYDROCHLORIDE 25 MG/ML
12.5 INJECTION INTRAMUSCULAR; INTRAVENOUS; SUBCUTANEOUS
Status: DISCONTINUED | OUTPATIENT
Start: 2022-04-13 | End: 2022-04-13 | Stop reason: HOSPADM

## 2022-04-13 RX ORDER — ONDANSETRON 2 MG/ML
4 INJECTION INTRAMUSCULAR; INTRAVENOUS ONCE AS NEEDED
Status: COMPLETED | OUTPATIENT
Start: 2022-04-13 | End: 2022-04-13

## 2022-04-13 RX ORDER — LORAZEPAM 2 MG/ML
1 INJECTION INTRAMUSCULAR
Status: DISCONTINUED | OUTPATIENT
Start: 2022-04-13 | End: 2022-04-13 | Stop reason: HOSPADM

## 2022-04-13 RX ORDER — SUCCINYLCHOLINE CHLORIDE 20 MG/ML
INJECTION INTRAMUSCULAR; INTRAVENOUS AS NEEDED
Status: DISCONTINUED | OUTPATIENT
Start: 2022-04-13 | End: 2022-04-13 | Stop reason: SURG

## 2022-04-13 RX ORDER — ACETAMINOPHEN 325 MG/1
650 TABLET ORAL EVERY 6 HOURS
Status: DISCONTINUED | OUTPATIENT
Start: 2022-04-13 | End: 2022-04-14 | Stop reason: HOSPADM

## 2022-04-13 RX ORDER — SODIUM CHLORIDE 9 MG/ML
125 INJECTION, SOLUTION INTRAVENOUS CONTINUOUS
Status: DISCONTINUED | OUTPATIENT
Start: 2022-04-13 | End: 2022-04-14 | Stop reason: HOSPADM

## 2022-04-13 RX ORDER — BUPIVACAINE HYDROCHLORIDE 5 MG/ML
INJECTION, SOLUTION EPIDURAL; INTRACAUDAL AS NEEDED
Status: DISCONTINUED | OUTPATIENT
Start: 2022-04-13 | End: 2022-04-13 | Stop reason: SURG

## 2022-04-13 RX ORDER — BISACODYL 10 MG
10 SUPPOSITORY, RECTAL RECTAL DAILY PRN
Status: DISCONTINUED | OUTPATIENT
Start: 2022-04-13 | End: 2022-04-14 | Stop reason: HOSPADM

## 2022-04-13 RX ORDER — ONDANSETRON 4 MG/1
4 TABLET, FILM COATED ORAL EVERY 6 HOURS PRN
Status: DISCONTINUED | OUTPATIENT
Start: 2022-04-13 | End: 2022-04-14 | Stop reason: HOSPADM

## 2022-04-13 RX ORDER — PROPOFOL 10 MG/ML
INJECTION, EMULSION INTRAVENOUS AS NEEDED
Status: DISCONTINUED | OUTPATIENT
Start: 2022-04-13 | End: 2022-04-13 | Stop reason: SURG

## 2022-04-13 RX ORDER — FENTANYL CITRATE 50 UG/ML
INJECTION, SOLUTION INTRAMUSCULAR; INTRAVENOUS AS NEEDED
Status: DISCONTINUED | OUTPATIENT
Start: 2022-04-13 | End: 2022-04-13 | Stop reason: SURG

## 2022-04-13 RX ORDER — ONDANSETRON 2 MG/ML
4 INJECTION INTRAMUSCULAR; INTRAVENOUS EVERY 6 HOURS PRN
Status: DISCONTINUED | OUTPATIENT
Start: 2022-04-13 | End: 2022-04-14 | Stop reason: HOSPADM

## 2022-04-13 RX ORDER — CEFAZOLIN SODIUM 2 G/50ML
2 SOLUTION INTRAVENOUS ONCE
Status: COMPLETED | OUTPATIENT
Start: 2022-04-13 | End: 2022-04-13

## 2022-04-13 RX ORDER — LIDOCAINE HYDROCHLORIDE 20 MG/ML
INJECTION, SOLUTION INTRAVENOUS AS NEEDED
Status: DISCONTINUED | OUTPATIENT
Start: 2022-04-13 | End: 2022-04-13 | Stop reason: SURG

## 2022-04-13 RX ORDER — DOCUSATE SODIUM 100 MG/1
100 CAPSULE, LIQUID FILLED ORAL 2 TIMES DAILY
Status: DISCONTINUED | OUTPATIENT
Start: 2022-04-13 | End: 2022-04-14 | Stop reason: HOSPADM

## 2022-04-13 RX ORDER — NEOSTIGMINE METHYLSULFATE 5 MG/5 ML
SYRINGE (ML) INTRAVENOUS AS NEEDED
Status: DISCONTINUED | OUTPATIENT
Start: 2022-04-13 | End: 2022-04-13 | Stop reason: SURG

## 2022-04-13 RX ORDER — ACETAMINOPHEN 650 MG/1
650 SUPPOSITORY RECTAL EVERY 6 HOURS
Status: DISCONTINUED | OUTPATIENT
Start: 2022-04-13 | End: 2022-04-14 | Stop reason: HOSPADM

## 2022-04-13 RX ORDER — MORPHINE SULFATE 2 MG/ML
2 INJECTION, SOLUTION INTRAMUSCULAR; INTRAVENOUS
Status: DISCONTINUED | OUTPATIENT
Start: 2022-04-13 | End: 2022-04-14 | Stop reason: HOSPADM

## 2022-04-13 RX ORDER — OXYCODONE HYDROCHLORIDE 5 MG/1
5 TABLET ORAL EVERY 4 HOURS PRN
Status: DISCONTINUED | OUTPATIENT
Start: 2022-04-13 | End: 2022-04-14 | Stop reason: HOSPADM

## 2022-04-13 RX ORDER — ONDANSETRON 2 MG/ML
INJECTION INTRAMUSCULAR; INTRAVENOUS AS NEEDED
Status: DISCONTINUED | OUTPATIENT
Start: 2022-04-13 | End: 2022-04-13 | Stop reason: SURG

## 2022-04-13 RX ORDER — CEFAZOLIN SODIUM 2 G/50ML
2 SOLUTION INTRAVENOUS EVERY 8 HOURS
Status: DISCONTINUED | OUTPATIENT
Start: 2022-04-13 | End: 2022-04-13

## 2022-04-13 RX ORDER — DEXAMETHASONE SODIUM PHOSPHATE 4 MG/ML
INJECTION, SOLUTION INTRA-ARTICULAR; INTRALESIONAL; INTRAMUSCULAR; INTRAVENOUS; SOFT TISSUE AS NEEDED
Status: DISCONTINUED | OUTPATIENT
Start: 2022-04-13 | End: 2022-04-13 | Stop reason: SURG

## 2022-04-13 RX ORDER — CEFAZOLIN SODIUM 2 G/50ML
2 SOLUTION INTRAVENOUS EVERY 8 HOURS
Status: COMPLETED | OUTPATIENT
Start: 2022-04-13 | End: 2022-04-14

## 2022-04-13 RX ORDER — NALOXONE HCL 0.4 MG/ML
0.4 VIAL (ML) INJECTION
Status: DISCONTINUED | OUTPATIENT
Start: 2022-04-13 | End: 2022-04-14 | Stop reason: HOSPADM

## 2022-04-13 RX ADMIN — ROCURONIUM BROMIDE 25 MG: 10 INJECTION INTRAVENOUS at 08:59

## 2022-04-13 RX ADMIN — FENTANYL CITRATE 100 MCG: 50 INJECTION INTRAMUSCULAR; INTRAVENOUS at 07:32

## 2022-04-13 RX ADMIN — HYDROMORPHONE HYDROCHLORIDE 0.5 MG: 1 INJECTION, SOLUTION INTRAMUSCULAR; INTRAVENOUS; SUBCUTANEOUS at 11:30

## 2022-04-13 RX ADMIN — SODIUM CHLORIDE 125 ML/HR: 9 INJECTION, SOLUTION INTRAVENOUS at 11:39

## 2022-04-13 RX ADMIN — Medication 3 MG: at 10:44

## 2022-04-13 RX ADMIN — DEXAMETHASONE SODIUM PHOSPHATE 4 MG: 4 INJECTION, SOLUTION INTRAMUSCULAR; INTRAVENOUS at 07:32

## 2022-04-13 RX ADMIN — HYDROMORPHONE HYDROCHLORIDE 0.5 MG: 1 INJECTION, SOLUTION INTRAMUSCULAR; INTRAVENOUS; SUBCUTANEOUS at 11:13

## 2022-04-13 RX ADMIN — SUCCINYLCHOLINE CHLORIDE 200 MG: 20 INJECTION, SOLUTION INTRAMUSCULAR; INTRAVENOUS at 07:32

## 2022-04-13 RX ADMIN — ROCURONIUM BROMIDE 50 MG: 10 INJECTION INTRAVENOUS at 07:32

## 2022-04-13 RX ADMIN — ONDANSETRON 4 MG: 2 INJECTION INTRAMUSCULAR; INTRAVENOUS at 07:32

## 2022-04-13 RX ADMIN — FENTANYL CITRATE 50 MCG: 50 INJECTION INTRAMUSCULAR; INTRAVENOUS at 10:24

## 2022-04-13 RX ADMIN — ACETAMINOPHEN 650 MG: 325 TABLET ORAL at 17:48

## 2022-04-13 RX ADMIN — CEFAZOLIN SODIUM 2 G: 2 SOLUTION INTRAVENOUS at 07:40

## 2022-04-13 RX ADMIN — FENTANYL CITRATE 50 MCG: 50 INJECTION INTRAMUSCULAR; INTRAVENOUS at 09:25

## 2022-04-13 RX ADMIN — DOCUSATE SODIUM 100 MG: 100 CAPSULE, LIQUID FILLED ORAL at 17:49

## 2022-04-13 RX ADMIN — CEFAZOLIN SODIUM 2 G: 2 SOLUTION INTRAVENOUS at 17:05

## 2022-04-13 RX ADMIN — SODIUM CHLORIDE 125 ML/HR: 9 INJECTION, SOLUTION INTRAVENOUS at 06:50

## 2022-04-13 RX ADMIN — SODIUM CHLORIDE 125 ML/HR: 9 INJECTION, SOLUTION INTRAVENOUS at 11:40

## 2022-04-13 RX ADMIN — GLYCOPYRROLATE 0.4 MG: 0.2 INJECTION, SOLUTION INTRAMUSCULAR; INTRAVENOUS at 10:44

## 2022-04-13 RX ADMIN — LIDOCAINE HYDROCHLORIDE 60 MG: 20 INJECTION, SOLUTION INTRAVENOUS at 07:32

## 2022-04-13 RX ADMIN — OXYCODONE HYDROCHLORIDE 5 MG: 5 TABLET ORAL at 16:23

## 2022-04-13 RX ADMIN — BUPIVACAINE HYDROCHLORIDE 30 ML: 5 INJECTION, SOLUTION EPIDURAL; INTRACAUDAL; PERINEURAL at 07:44

## 2022-04-13 RX ADMIN — BUPIVACAINE HYDROCHLORIDE 30 ML: 5 INJECTION, SOLUTION EPIDURAL; INTRACAUDAL; PERINEURAL at 07:42

## 2022-04-13 RX ADMIN — PROPOFOL 200 MG: 10 INJECTION, EMULSION INTRAVENOUS at 07:32

## 2022-04-13 RX ADMIN — ONDANSETRON 4 MG: 2 INJECTION INTRAMUSCULAR; INTRAVENOUS at 11:13

## 2022-04-13 RX ADMIN — SODIUM CHLORIDE 125 ML/HR: 9 INJECTION, SOLUTION INTRAVENOUS at 19:38

## 2022-04-13 NOTE — ANESTHESIA PROCEDURE NOTES
Bilateral Transversus Abdominis Plane (TAP) Block      Patient location during procedure: OR  Start time: 4/13/2022 7:40 AM  Stop time: 4/13/2022 7:45 AM  Reason for block: procedure for pain, at surgeon's request and post-op pain management  Performed by  CRNA: Asim Canales CRNA  Preanesthetic Checklist  Completed: patient identified, IV checked, site marked, risks and benefits discussed, surgical consent, monitors and equipment checked, pre-op evaluation and timeout performed  Prep:  Pt Position: supine  Sterile barriers:cap, mask and gloves  Prep: ChloraPrep  Patient monitoring: EKG, continuous pulse oximetry and blood pressure monitoring  Procedure  Performed under: general  Guidance:ultrasound guided and landmark technique    ULTRASOUND INTERPRETATION. Using ultrasound guidance a 20 G gauge needle was placed in close proximity to the nerve, at which point, under ultrasound guidance anesthetic was injected in the area of the nerve and spread of the anesthesia was seen on ultrasound in close proximity thereto.  There were no abnormalities seen on ultrasound; a digital image was taken; and the patient tolerated the procedure with no complications. Images:still images not obtained    Laterality:Bilateral  Block Type:TAP  Injection Technique:single-shot  Needle Type:echogenic  Needle Gauge:20 G  Resistance on Injection: none          Medications  Comment:Bupivacaine 0.5% 30mL administered on each side    Post Assessment  Injection Assessment: negative aspiration for heme and incremental injection  Complications:no

## 2022-04-13 NOTE — PLAN OF CARE
Goal Outcome Evaluation:  Plan of Care Reviewed With: patient        Progress: improving  Outcome Evaluation: VSS, pain undercontrol, urin clear yellow.

## 2022-04-13 NOTE — ANESTHESIA POSTPROCEDURE EVALUATION
Patient: Debbie Verma    Procedure Summary     Date: 04/13/22 Room / Location: [facility] OR  /  [facility]    Anesthesia Start: 0730 Anesthesia Stop: 1101    Procedure: NEPHRECTOMY LAPAROSCOPIC HAND ASSISTED (Left Abdomen) Diagnosis:       Renal mass      (Renal mass [N28.89])    Surgeons: Romel Broussard MD Provider: Maged Tinoco CRNA    Anesthesia Type: general with block ASA Status: 2          Anesthesia Type: general with block    Vitals  Vitals Value Taken Time   /59 04/13/22 1113   Temp 98.5 °F (36.9 °C) 04/13/22 1053   Pulse 67 04/13/22 1117   Resp 20 04/13/22 1103   SpO2 100 % 04/13/22 1117   Vitals shown include unvalidated device data.        Post Anesthesia Care and Evaluation    Patient location during evaluation: PACU  Patient participation: complete - patient participated  Level of consciousness: awake and alert and awake  Pain score: 3  Pain management: adequate  Airway patency: patent  Anesthetic complications: No anesthetic complications  PONV Status: controlled  Cardiovascular status: acceptable, hemodynamically stable and stable  Respiratory status: acceptable and nasal cannula  Hydration status: acceptable

## 2022-04-13 NOTE — PAYOR COMM NOTE
"TO:[facility]  FROM:CHANDRAKANT CHRISTOPHER, RN PHONE [phone]  INPT CLINICALS MR# [id number]  REF#[id number]    Debbie Verma (41 y.o. Female)             Date of Birth   1980    Social Security Number       Address   [address] [place] [address]    Home Phone   [phone]    MRN   [id number]       Buddhist   Unknown    Marital Status   Single                            Admission Date   4/13/22    Admission Type   Elective    Admitting Provider   Romel Broussard MD    Attending Provider   Romel Broussard MD    Department, Room/Bed   [facility], W209/1       Discharge Date       Discharge Disposition       Discharge Destination                               Attending Provider: Romel Broussard MD    Allergies: No Known Allergies    Isolation: None   Infection: None   Code Status: Not on file   Advance Care Planning Activity    Ht: 160 cm (63\")   Wt: --    Admission Cmt: None   Principal Problem: Renal mass [N28.89]                 Active Insurance as of 4/13/2022     Primary Coverage     Payor Plan Insurance Group Employer/Plan Group    [facility] COMMUNITY PLAN [facility] COMMUNITY PLAN [facility]     Payor Plan Address Payor Plan Phone Number Payor Plan Fax Number Effective Dates    [address]   3/29/2022 - None Entered    [facility] [id number]       Subscriber Name Subscriber Birth Date Member ID       DEBBIE VERMA 1980 [id number]                 Emergency Contacts      (Rel.) Home Phone Work Phone Mobile Phone    Danny Colon (Significant Other) [phone] -- --               History & Physical      Romel Broussard MD at 04/13/22 0717          HPI  Debbie Verma is a 41 y.o. with history of   1. Renal mass         No recent fevers or new LUTS  Does not take any blood thinners    Past Medical History  Past Medical History:   Diagnosis Date   • Lab test positive for detection of COVID-19 virus " 01/2021    was tested in [facility] [place]  got pneumonia secondary.   • Pneumonia 01/2021    secondary to COVID       Past Surgical History  Past Surgical History:   Procedure Laterality Date   • TUBAL ABDOMINAL LIGATION         Medications    Current Facility-Administered Medications:   •  ceFAZolin Sodium-Dextrose (ANCEF) IVPB (duplex) 2 g, 2 g, Intravenous, Once, Telly Frost, [facility]  •  sodium chloride 0.9 % infusion, 125 mL/hr, Intravenous, Continuous, Romel Broussard MD, Last Rate: 125 mL/hr at 04/13/22 0650, 125 mL/hr at 04/13/22 0650    Allergies  No Known Allergies    Social History  Social History     Socioeconomic History   • Marital status: Single   Tobacco Use   • Smoking status: Never Smoker   • Smokeless tobacco: Never Used   Vaping Use   • Vaping Use: Never used   Substance and Sexual Activity   • Alcohol use: No   • Drug use: No   • Sexual activity: Defer       Review of Systems  Constitutional: No fevers or chills  Skin: Negative for rash  Endocrine: No heat/cold intolerance   Cardiovascular: Negative for chest pain or dyspnea on exertion  Respiratory: Negative for shortness of breath or wheezing  Gastrointestinal: No constipation, nausea or vomiting  Genitourinary: Negative for new lower urinary tract symptoms, current gross hematuria or dysuria.  Musculoskeletal: No flank pain  Neurological:  Negative for frequent headaches or dizziness  Lymph/Heme: Negative for leg swelling or calf pain.    Physical Exam  Visit Vitals  /84 (BP Location: Right arm, Patient Position: Sitting)   Pulse 78   Temp 98.5 °F (36.9 °C) (Temporal)   Resp 18   LMP 03/24/2022   SpO2 97%     Constitutional: NAD, WDWN.   HEENT: NCAT. Conjunctivae normal.  MMM.    Cardiovascular: Regular rate.  Pulmonary/Chest: Respirations are even and non-labored bilaterally.  Abdominal: Soft. No distension, tenderness, masses or guarding. No CVA tenderness.  Neurological: A + O x 3.  Cranial Nerves II-XII grossly intact. Normal  gait.  Extremities: JESSICA x 4, Warm. No clubbing.  No cyanosis.    Skin: Pink, warm and dry.  No rashes noted.  Psychiatric:  Normal mood and affect    Labs & Imaging  Lab Results   Component Value Date    GLUCOSE 113 (H) 03/30/2022    CALCIUM 8.5 (L) 03/30/2022     03/30/2022    K 3.8 03/30/2022    CO2 23.8 03/30/2022     03/30/2022    BUN 16 03/30/2022    CREATININE 0.59 03/30/2022    EGFRIFAFRI 121 01/23/2021    EGFRIFNONA 100 01/23/2021    BCR 27.1 (H) 03/30/2022    ANIONGAP 11.2 03/30/2022     Lab Results   Component Value Date    WBC 8.78 03/30/2022    HGB 11.7 (L) 03/30/2022    HCT 36.4 03/30/2022    MCV 77.4 (L) 03/30/2022     03/30/2022     Brief Urine Lab Results  (Last result in the past 365 days)      Color   Clarity   Blood   Leuk Est   Nitrite   Protein   CREAT   Urine HCG        04/13/22 0659               Negative           PROCEDURE: CT ABDOMEN W WO CONTRAST-     HISTORY: follow up renal cyst; N28.1-Cyst of kidney, acquired     COMPARISON: 1/23/2021 and 10/16/2020.     TECHNIQUE: Multiple axial CT images were obtained from the lung bases  through the abdomen before and following the administration of  Isovue-300. .      FINDINGS:      ABDOMEN: The lung bases are clear are clear. The heart is normal in  size. The liver is diffusely hypodense consistent with fatty  infiltration. Stones are present within the gallbladder. The spleen is  unremarkable. No adrenal mass is present.  The pancreas is unremarkable.  Precontrast images reveal no evidence of nephrolithiasis. The kidneys  enhance appropriately. There is a 4.9 x 4.2 x 3.9 cm exophytic lesion  involving the mid left kidney which has enhancing internal septations on  the postcontrast images measuring 11 HU precontrast 26 HU on the  postcontrast images and 17 HU on the delayed images. This lesion  measured 4.0 x 3.0 x 2.3 cm on the exam from January. The renal veins  are patent. The aorta is normal in caliber. There is no free fluid  or  adenopathy. The abdominal portions of the GI tract are unremarkable.      Bone windows reveal no osseous abnormalities.     IMPRESSION:  Interval increase in size of the cystic lesion in the mid  left kidney with enhancing internal septations. A cystic renal cell  carcinoma is not excluded with this exam.      Assessment  Debbie Verma is a 41 y.o. female who presents with the following diagnosis:  1. Renal mass         Plan  1. To OR for LEFT NEPHRECTOMY LAPAROSCOPIC HAND ASSISTED     Romel Broussard MD          Electronically signed by Romel Broussard MD at 04/13/22 0718       Vital Signs (last day)     Date/Time Temp Temp src Pulse Resp BP Patient Position SpO2    04/13/22 1315 98.3 (36.8) Oral 87 18 110/71 Lying 93    04/13/22 1245 -- -- 86 18 103/62 Lying 95    04/13/22 1215 98.5 (36.9) Oral 79 18 117/76 Lying 96    04/13/22 1158 97.5 (36.4) Temporal 66 14 110/63 Lying 96    04/13/22 1148 -- -- 70 14 112/57 -- 99    04/13/22 1138 -- -- 75 16 116/55 Lying 92    04/13/22 1128 -- -- 77 16 116/59 Lying 98    04/13/22 1118 -- -- 71 20 117/60 Lying 100    04/13/22 1113 -- -- 74 16 115/59 Lying 100    04/13/22 1108 -- -- 75 20 120/51 Lying 100    04/13/22 1103 -- -- 74 20 116/52 Lying 100    04/13/22 1058 -- -- 70 20 117/55 Lying 100    04/13/22 1053 98.5 (36.9) Temporal 78 20 118/48 Lying 100    04/13/22 0628 98.5 (36.9) Temporal 78 18 153/84 Sitting 97            Current Facility-Administered Medications   Medication Dose Route Frequency Provider Last Rate Last Admin   • acetaminophen (TYLENOL) tablet 650 mg  650 mg Oral Q6H Romel Broussard MD        Or   • acetaminophen (TYLENOL) suppository 650 mg  650 mg Rectal Q6H Romel Broussard MD       • bisacodyl (DULCOLAX) suppository 10 mg  10 mg Rectal Daily PRN Romel Broussard MD       • ceFAZolin Sodium-Dextrose (ANCEF) IVPB (duplex) 2 g  2 g Intravenous Q8H Romel Broussard MD       • docusate sodium (COLACE) capsule 100  mg  100 mg Oral BID Romel Broussard MD       • Morphine sulfate (PF) injection 2 mg  2 mg Intravenous Q2H PRN Romel Broussard MD        And   • naloxone (NARCAN) injection 0.4 mg  0.4 mg Intravenous Q5 Min PRN Romel Broussard MD       • ondansetron (ZOFRAN) tablet 4 mg  4 mg Oral Q6H PRN Romel Broussard MD        Or   • ondansetron (ZOFRAN) injection 4 mg  4 mg Intravenous Q6H PRN Romel Broussard MD       • oxyCODONE (ROXICODONE) immediate release tablet 5 mg  5 mg Oral Q4H PRN Romel Broussard MD       • sodium chloride 0.9 % infusion  125 mL/hr Intravenous Continuous Romel Broussard  mL/hr at 04/13/22 0650 125 mL/hr at 04/13/22 0650   • sodium chloride 0.9 % infusion  125 mL/hr Intravenous Continuous Romel Broussard MD   Stopped at 04/13/22 1141       Lab Results (last 24 hours)     Procedure Component Value Units Date/Time    Tissue Pathology Exam [[id number]] Collected: 04/13/22 1004    Specimen: Tissue from Kidney, Left Updated: 04/13/22 1341    POC Pregnancy, Urine [[id number]]  (Normal) Collected: 04/13/22 0659    Specimen: Urine Updated: 04/13/22 0700     HCG, Urine, QL Negative     Lot Number [id number]     Internal Positive Control Passed     Internal Negative Control Passed     Expiration Date 07/31/2023        Imaging Results (Last 24 Hours)     ** No results found for the last 24 hours. **           Operative/Procedure Notes (last 24 hours)      Romel Broussard MD at 04/13/22 0814        Preoperative diagnosis  Left renal mass    Postoperative diagnosis  Left renal mass    Procedure performed  1. Left laparoscopic hand-assisted radical nephrectomy    Attending surgeon  Romel Broussard MD    Anesthesia  General    EBL  20 mL    Complications  None    Specimen  Left kidney with perihilar lymph nodes    Findings  Large amount of body wall and visceral fat.  Kidney was removed intact with tumor margins appeared grossly negative.    Indications  Ms.  Luci is a 41 y.o. patient who was found to have a left renal mass   After discussing different treatment options, she opted for a laparoscopic radical nephrectomy.  She now presents for this procedure.   Informed consent was obtained.    Procedure  The patient was taken to the operating room and placed supine on the operating table.  Pre-operative antibiotics were administered.  Bilateral lower extremity SCDs were placed.  After induction of general anesthesia, a Gonzalez catheter was placed without difficulty as was an orogastric tube.  The patient then received a four-quadrant TAP / rectus block with anesthesia. She was then repositioned in the lateral position with her left side up. All bony prominences were appropriately padded. She was secured to the table with beanbag, foam and tape.  She was prepped and draped in a sterile fashion and a timeout was performed.    Initial entrance was gained by making a midline incision just long enough for the hand port.  Electrocautery dissection was carried down to the fascia which was incised and opened.  The peritoneum was opened sharply and care was taken not to injure any bowel.  After this was open, the Ladarius retractor was then placed into the wound and the GelPort cover was placed over top.  The camera trocar was then placed through the GelPort and the abdomen was insufflated.  We then placed a a 5-mm trocar under direct vision for the camera and a 12 mm port for the working instrument/stapler and a line from the hand port to the ASIS. The left colon was mobilized from the pelvis to around the splenic flexure. A good plane was developed between the colonic mesentery and Gerota's fascia. This was fully mobilized medially. Attachments to the spleen were taken down. The ureter and gonadal vein were identified inferiorly and reflected laterally and dissection commenced along the psoas fascia up towards the level of the renal hilum. A single renal artery and renal  vein were identified with early branching. The renal artery and renal vein were divided with an Endo LUCIANO stapling device. The lateral and superior attachments were taken down.     The adrenal gland was dissected off the superior kidney and the left adrenal was spared.  All the posterior, lateral and superior attachments were fully taken down. The ureter and gonadal vein were divided inferiorly between clips and the specimen was fully freed.     The specimen was then extracted through the GelPort.  We closed the peritoneum and rectus fascias together in a running #1 PDS. Bupivicaine was injected subcutaneously.  Subcutaneous tissues were irrigated and closed with 3-0 Vicryl suture and skin incisions were closed with subcuticular barbed suture. Dermabond was applied. The patient was repositioned supine.    The patient tolerated the procedure well.  All sponge and instrument counts were correct x 2. The patient was taken to the recovery unit without incident.     Electronically signed by Romel Broussard MD at 04/13/22 1111       Physician Progress Notes (last 24 hours)  Notes from 04/12/22 1410 through 04/13/22 1410   No notes of this type exist for this encounter.         Consult Notes (last 24 hours)  Notes from 04/12/22 1410 through 04/13/22 1410   No notes of this type exist for this encounter.

## 2022-04-13 NOTE — DISCHARGE INSTRUCTIONS
Home Care after laparoscopic nephrectomy  Follow these guidelines for your care after your surgery to help your recovery.    Activity  Limit your activity for the first 5 days after surgery to light activity.  You may return to work in a day or so when comfortable.  Limit lifting, pushing or pulling to less than 20 pounds for the next 14 days. Also limit running and long walks.     Incision care  Stitches and clear dressing will dissolve and do not need to be removed.   Expect a small amount of blood may stain the dressings for up to 72 hours after surgery.     Bathing or showering  You may shower 48 to 72 hours after surgery. Allow the water to wash over the incision but do not scrub the incision. Dry the site gently by patting it with a clean towel.   Tub baths should be avoided for 7 days after surgery.   Swimming or hot tubs should be avoided for 14 days after surgery or until the catheter is removed.      Pain control  You will likely be sent home with a prescription for a few days of pain medicine.  Use this only as needed.  Take the scheduled Tylenol for the next 3 to 5 days.  After 48 hours, most patients can take extra strength Tylenol (acetaminophen) or Advil (ibuprofen) for pain, following the label directions. Pain most often is eased after 5 to 7 days.    Sexual activity  You may resume sexual activity when the swelling is decreased and you are comfortable.     When to call your doctor  Call your surgeon’s office  right away if you have:  Severe swelling, larger than the size of an orange   A large amount of fluid drainage that soaks several pads per day  Pain that is not controlled with pain medicine and use of ice packs or worsens after 48 hours  Any signs of infection such as:  Increased redness or tenderness around the incision site  Pus type drainage from the incision  Fever of greater than 101 degrees F    Other Contacts  Urology Office:  [address] [facility] [address]   [place] [address] [phone] office  [phone] fax    Follow up Appointment  Please call Dr. Broussard's office to make a follow-up appointment in 1 week to discuss pathology

## 2022-04-13 NOTE — ANESTHESIA PREPROCEDURE EVALUATION
Anesthesia Evaluation     Patient summary reviewed and Nursing notes reviewed   NPO Solid Status: > 8 hours  NPO Liquid Status: > 8 hours           Airway   Mallampati: II  TM distance: >3 FB  Neck ROM: full  Possible difficult intubation and Large neck circumference  Dental - normal exam     Pulmonary - normal exam   (+) pneumonia resolved ,   Cardiovascular - normal exam        Neuro/Psych  GI/Hepatic/Renal/Endo    (+) obesity,   renal disease,     Musculoskeletal     Abdominal  - normal exam   Substance History      OB/GYN          Other      history of cancer active                  Anesthesia Plan    ASA 2     general with block     intravenous induction     Anesthetic plan, all risks, benefits, and alternatives have been provided, discussed and informed consent has been obtained with: patient.    Plan discussed with CRNA.        CODE STATUS:

## 2022-04-13 NOTE — H&P
HPI  Debbie Verma is a 41 y.o. with history of   1. Renal mass         No recent fevers or new LUTS  Does not take any blood thinners    Past Medical History  Past Medical History:   Diagnosis Date   • Lab test positive for detection of COVID-19 virus 01/2021    was tested in [facility].  got pneumonia secondary.   • Pneumonia 01/2021    secondary to COVID       Past Surgical History  Past Surgical History:   Procedure Laterality Date   • TUBAL ABDOMINAL LIGATION         Medications    Current Facility-Administered Medications:   •  ceFAZolin Sodium-Dextrose (ANCEF) IVPB (duplex) 2 g, 2 [facility], [facility]  •  sodium chloride 0.9 % infusion, 125 mL/hr, Intravenous, Continuous, Romel Broussard MD, Last Rate: 125 mL/hr at 04/13/22 0650, 125 mL/hr at 04/13/22 0650    Allergies  No Known Allergies    Social History  Social History     Socioeconomic History   • Marital status: Single   Tobacco Use   • Smoking status: Never Smoker   • Smokeless tobacco: Never Used   Vaping Use   • Vaping Use: Never used   Substance and Sexual Activity   • Alcohol use: No   • Drug use: No   • Sexual activity: Defer       Review of Systems  Constitutional: No fevers or chills  Skin: Negative for rash  Endocrine: No heat/cold intolerance   Cardiovascular: Negative for chest pain or dyspnea on exertion  Respiratory: Negative for shortness of breath or wheezing  Gastrointestinal: No constipation, nausea or vomiting  Genitourinary: Negative for new lower urinary tract symptoms, current gross hematuria or dysuria.  Musculoskeletal: No flank pain  Neurological:  Negative for frequent headaches or dizziness  Lymph/Heme: Negative for leg swelling or calf pain.    Physical Exam  Visit Vitals  /84 (BP Location: Right arm, Patient Position: Sitting)   Pulse 78   Temp 98.5 °F (36.9 °C) (Temporal)   Resp 18   LMP 03/24/2022   SpO2 97%     Constitutional: NAD, WDWN.   HEENT: NCAT. Conjunctivae normal.  MMM.     Cardiovascular: Regular rate.  Pulmonary/Chest: Respirations are even and non-labored bilaterally.  Abdominal: Soft. No distension, tenderness, masses or guarding. No CVA tenderness.  Neurological: A + O x 3.  Cranial Nerves II-XII grossly intact. Normal gait.  Extremities: JESSICA x 4, Warm. No clubbing.  No cyanosis.    Skin: Pink, warm and dry.  No rashes noted.  Psychiatric:  Normal mood and affect    Labs & Imaging  Lab Results   Component Value Date    GLUCOSE 113 (H) 03/30/2022    CALCIUM 8.5 (L) 03/30/2022     03/30/2022    K 3.8 03/30/2022    CO2 23.8 03/30/2022     03/30/2022    BUN 16 03/30/2022    CREATININE 0.59 03/30/2022    EGFRIFAFRI 121 01/23/2021    EGFRIFNONA 100 01/23/2021    BCR 27.1 (H) 03/30/2022    ANIONGAP 11.2 03/30/2022     Lab Results   Component Value Date    WBC 8.78 03/30/2022    HGB 11.7 (L) 03/30/2022    HCT 36.4 03/30/2022    MCV 77.4 (L) 03/30/2022     03/30/2022     Brief Urine Lab Results  (Last result in the past 365 days)      Color   Clarity   Blood   Leuk Est   Nitrite   Protein   CREAT   Urine HCG        04/13/22 0659               Negative           PROCEDURE: CT ABDOMEN W WO CONTRAST-     HISTORY: follow up renal cyst; N28.1-Cyst of kidney, acquired     COMPARISON: 1/23/2021 and 10/16/2020.     TECHNIQUE: Multiple axial CT images were obtained from the lung bases  through the abdomen before and following the administration of  Isovue-300. .      FINDINGS:      ABDOMEN: The lung bases are clear are clear. The heart is normal in  size. The liver is diffusely hypodense consistent with fatty  infiltration. Stones are present within the gallbladder. The spleen is  unremarkable. No adrenal mass is present.  The pancreas is unremarkable.  Precontrast images reveal no evidence of nephrolithiasis. The kidneys  enhance appropriately. There is a 4.9 x 4.2 x 3.9 cm exophytic lesion  involving the mid left kidney which has enhancing internal septations on  the  postcontrast images measuring 11 HU precontrast 26 HU on the  postcontrast images and 17 HU on the delayed images. This lesion  measured 4.0 x 3.0 x 2.3 cm on the exam from January. The renal veins  are patent. The aorta is normal in caliber. There is no free fluid or  adenopathy. The abdominal portions of the GI tract are unremarkable.      Bone windows reveal no osseous abnormalities.     IMPRESSION:  Interval increase in size of the cystic lesion in the mid  left kidney with enhancing internal septations. A cystic renal cell  carcinoma is not excluded with this exam.      Assessment  Debbie Verma is a 41 y.o. female who presents with the following diagnosis:  1. Renal mass         Plan  1. To OR for LEFT NEPHRECTOMY LAPAROSCOPIC HAND ASSISTED     Romel Broussard MD

## 2022-04-13 NOTE — ANESTHESIA PROCEDURE NOTES
Airway  Urgency: elective    Date/Time: 4/13/2022 7:35 AM  Airway not difficult    General Information and Staff    Patient location during procedure: OR  CRNA: Maged Tinoco CRNA    Indications and Patient Condition  Indications for airway management: airway protection    Preoxygenated: yes  Mask difficulty assessment: 1 - vent by mask    Final Airway Details  Final airway type: endotracheal airway      Successful airway: ETT  Cuffed: yes   Successful intubation technique: direct laryngoscopy  Facilitating devices/methods: intubating stylet  Endotracheal tube insertion site: oral  Blade: Pebbles  Blade size: 3  ETT size (mm): 7.0  Cormack-Lehane Classification: grade I - full view of glottis  Placement verified by: chest auscultation and capnometry   Cuff volume (mL): 10  Measured from: lips  ETT/EBT  to lips (cm): 22  Number of attempts at approach: 1  Assessment: lips, teeth, and gum same as pre-op and atraumatic intubation    Additional Comments  Airway placed without problems. Dentition and Lips as pre-induction. ETT cuff inflated to minimal occlusive pressure.

## 2022-04-13 NOTE — OP NOTE
Preoperative diagnosis  Left renal mass    Postoperative diagnosis  Left renal mass    Procedure performed  1. Left laparoscopic hand-assisted radical nephrectomy    Attending surgeon  Romel Broussard MD    Anesthesia  General    EBL  20 mL    Complications  None    Specimen  Left kidney with perihilar lymph nodes    Findings  Large amount of body wall and visceral fat.  Kidney was removed intact with tumor margins appeared grossly negative.    Indications  Ms. Verma is a 41 y.o. patient who was found to have a left renal mass   After discussing different treatment options, she opted for a laparoscopic radical nephrectomy.  She now presents for this procedure.   Informed consent was obtained.    Procedure  The patient was taken to the operating room and placed supine on the operating table.  Pre-operative antibiotics were administered.  Bilateral lower extremity SCDs were placed.  After induction of general anesthesia, a Gonzalez catheter was placed without difficulty as was an orogastric tube.  The patient then received a four-quadrant TAP / rectus block with anesthesia. She was then repositioned in the lateral position with her left side up. All bony prominences were appropriately padded. She was secured to the table with beanbag, foam and tape.  She was prepped and draped in a sterile fashion and a timeout was performed.    Initial entrance was gained by making a midline incision just long enough for the hand port.  Electrocautery dissection was carried down to the fascia which was incised and opened.  The peritoneum was opened sharply and care was taken not to injure any bowel.  After this was open, the Ladarius retractor was then placed into the wound and the GelPort cover was placed over top.  The camera trocar was then placed through the GelPort and the abdomen was insufflated.  We then placed a a 5-mm trocar under direct vision for the camera and a 12 mm port for the working instrument/stapler and a line  from the hand port to the ASIS. The left colon was mobilized from the pelvis to around the splenic flexure. A good plane was developed between the colonic mesentery and Gerota's fascia. This was fully mobilized medially. Attachments to the spleen were taken down. The ureter and gonadal vein were identified inferiorly and reflected laterally and dissection commenced along the psoas fascia up towards the level of the renal hilum. A single renal artery and renal vein were identified with early branching. The renal artery and renal vein were divided with an Endo LUCIANO stapling device. The lateral and superior attachments were taken down.     The adrenal gland was dissected off the superior kidney and the left adrenal was spared.  All the posterior, lateral and superior attachments were fully taken down. The ureter and gonadal vein were divided inferiorly between clips and the specimen was fully freed.     The specimen was then extracted through the GelPort.  We closed the peritoneum and rectus fascias together in a running #1 PDS. Bupivicaine was injected subcutaneously.  Subcutaneous tissues were irrigated and closed with 3-0 Vicryl suture and skin incisions were closed with subcuticular barbed suture. Dermabond was applied. The patient was repositioned supine.    The patient tolerated the procedure well.  All sponge and instrument counts were correct x 2. The patient was taken to the recovery unit without incident.

## 2022-04-14 VITALS
SYSTOLIC BLOOD PRESSURE: 121 MMHG | OXYGEN SATURATION: 97 % | HEART RATE: 66 BPM | TEMPERATURE: 98.7 F | RESPIRATION RATE: 18 BRPM | DIASTOLIC BLOOD PRESSURE: 66 MMHG

## 2022-04-14 LAB
ANION GAP SERPL CALCULATED.3IONS-SCNC: 9 MMOL/L (ref 5–15)
BUN SERPL-MCNC: 10 MG/DL (ref 6–20)
BUN/CREAT SERPL: 11.2 (ref 7–25)
CALCIUM SPEC-SCNC: 7.7 MG/DL (ref 8.6–10.5)
CHLORIDE SERPL-SCNC: 107 MMOL/L (ref 98–107)
CO2 SERPL-SCNC: 21 MMOL/L (ref 22–29)
CREAT SERPL-MCNC: 0.89 MG/DL (ref 0.57–1)
DEPRECATED RDW RBC AUTO: 41.8 FL (ref 37–54)
EGFRCR SERPLBLD CKD-EPI 2021: 84.7 ML/MIN/1.73
ERYTHROCYTE [DISTWIDTH] IN BLOOD BY AUTOMATED COUNT: 15 % (ref 12.3–15.4)
GLUCOSE SERPL-MCNC: 120 MG/DL (ref 65–99)
HCT VFR BLD AUTO: 32.5 % (ref 34–46.6)
HGB BLD-MCNC: 10.4 G/DL (ref 12–15.9)
MCH RBC QN AUTO: 24.9 PG (ref 26.6–33)
MCHC RBC AUTO-ENTMCNC: 32 G/DL (ref 31.5–35.7)
MCV RBC AUTO: 77.8 FL (ref 79–97)
PLATELET # BLD AUTO: 279 10*3/MM3 (ref 140–450)
PMV BLD AUTO: 9.3 FL (ref 6–12)
POTASSIUM SERPL-SCNC: 4 MMOL/L (ref 3.5–5.2)
RBC # BLD AUTO: 4.18 10*6/MM3 (ref 3.77–5.28)
SODIUM SERPL-SCNC: 137 MMOL/L (ref 136–145)
WBC NRBC COR # BLD: 12.92 10*3/MM3 (ref 3.4–10.8)

## 2022-04-14 PROCEDURE — 0 CEFAZOLIN SODIUM-DEXTROSE 2-3 GM-%(50ML) RECONSTITUTED SOLUTION: Performed by: UROLOGY

## 2022-04-14 PROCEDURE — 80048 BASIC METABOLIC PNL TOTAL CA: CPT | Performed by: UROLOGY

## 2022-04-14 PROCEDURE — 85027 COMPLETE CBC AUTOMATED: CPT | Performed by: UROLOGY

## 2022-04-14 RX ORDER — DOCUSATE SODIUM 100 MG/1
100 CAPSULE, LIQUID FILLED ORAL 2 TIMES DAILY
Qty: 15 CAPSULE | Refills: 1 | Status: SHIPPED | OUTPATIENT
Start: 2022-04-14 | End: 2022-07-27

## 2022-04-14 RX ORDER — ACETAMINOPHEN 500 MG
1000 TABLET ORAL EVERY 6 HOURS
Qty: 30 TABLET | Refills: 0 | Status: SHIPPED | OUTPATIENT
Start: 2022-04-14 | End: 2022-04-18

## 2022-04-14 RX ORDER — OXYCODONE HYDROCHLORIDE 5 MG/1
5 TABLET ORAL EVERY 6 HOURS PRN
Qty: 10 TABLET | Refills: 0 | Status: SHIPPED | OUTPATIENT
Start: 2022-04-14 | End: 2022-07-27

## 2022-04-14 RX ADMIN — SODIUM CHLORIDE 125 ML/HR: 9 INJECTION, SOLUTION INTRAVENOUS at 03:40

## 2022-04-14 RX ADMIN — CEFAZOLIN SODIUM 2 G: 2 SOLUTION INTRAVENOUS at 08:11

## 2022-04-14 RX ADMIN — CEFAZOLIN SODIUM 2 G: 2 SOLUTION INTRAVENOUS at 00:02

## 2022-04-14 RX ADMIN — DOCUSATE SODIUM 100 MG: 100 CAPSULE, LIQUID FILLED ORAL at 08:11

## 2022-04-14 RX ADMIN — OXYCODONE HYDROCHLORIDE 5 MG: 5 TABLET ORAL at 03:41

## 2022-04-14 RX ADMIN — OXYCODONE HYDROCHLORIDE 5 MG: 5 TABLET ORAL at 12:21

## 2022-04-14 RX ADMIN — ACETAMINOPHEN 650 MG: 325 TABLET ORAL at 05:45

## 2022-04-14 RX ADMIN — OXYCODONE HYDROCHLORIDE 5 MG: 5 TABLET ORAL at 08:11

## 2022-04-14 RX ADMIN — ACETAMINOPHEN 650 MG: 325 TABLET ORAL at 12:11

## 2022-04-14 RX ADMIN — ACETAMINOPHEN 650 MG: 325 TABLET ORAL at 00:02

## 2022-04-14 NOTE — PROGRESS NOTES
Patient: Debbie Verma  Procedure(s):  NEPHRECTOMY LAPAROSCOPIC HAND ASSISTED  Anesthesia type: general with block    Patient location: Labor and Delivery  Last vitals:   Vitals:    04/14/22 0900   BP: 119/68   Pulse: 67   Resp: 18   Temp: 99 °F (37.2 °C)   SpO2: 98%     Level of consciousness: awake, alert and oriented    Post-anesthesia pain: adequate analgesia  Airway patency: patent  Respiratory: unassisted  Cardiovascular: stable and blood pressure at baseline  Hydration: euvolemic    Anesthetic complications: no

## 2022-04-14 NOTE — CASE MANAGEMENT/SOCIAL WORK
Case Management Discharge Note                Selected Continued Care - Discharged on 4/14/2022 Admission date: 4/13/2022 - Discharge disposition: Home or Self Care    Destination    No services have been selected for the patient.              Durable Medical Equipment    No services have been selected for the patient.              Dialysis/Infusion    No services have been selected for the patient.              Home Medical Care    No services have been selected for the patient.              Therapy    No services have been selected for the patient.              Community Resources    No services have been selected for the patient.              Community & DME    No services have been selected for the patient.                  Transportation Services  Private: Car    Final Discharge Disposition Code: 01 - home or self-care

## 2022-04-14 NOTE — PLAN OF CARE
Goal Outcome Evaluation:  Plan of Care Reviewed With: patient           Outcome Evaluation: VSS, adequate pain control, ambulated to BR without difficulty,continue with care

## 2022-04-14 NOTE — DISCHARGE SUMMARY
[facility]   DISCHARGE SUMMARY      Name:  Debbie Verma   Age:  41 y.o.  Sex:  female  :  1980  MRN:  [id number]   Visit Number:  [id number]  Primary Care Physician:  Provider, No Known  Date of Discharge:  2022  Admission Date:  2022      Discharge Diagnosis:     Active Hospital Problems    Diagnosis  POA   • **Renal mass [N28.89]  Unknown      Resolved Hospital Problems   No resolved problems to display.         Presenting Problem/History of Present Illness:    Renal mass [N28.89]       Hospital Course:  41-year-old female with history of left renal mass, which was suspicious for malignancy underwent uncomplicated left laparoscopic nephrectomy after discussion of the risks, benefits, and alternatives.  She was discharged on postoperative day 1 with clear yellow urine, excellent urine output, normal renal function, was tolerating regular diet and ambulating with pain well controlled.  She will follow-up in 1 week for pathology discussion.    Procedures Performed:    Procedure(s):  NEPHRECTOMY LAPAROSCOPIC HAND ASSISTED       Consults:     Consults     No orders found for last 30 day(s).          Pertinent Test Results:     Lab Results (all)     Procedure Component Value Units Date/Time    Basic Metabolic Panel [[id number]]  (Abnormal) Collected: 22    Specimen: Blood Updated: 22     Glucose 120 mg/dL      BUN 10 mg/dL      Creatinine 0.89 mg/dL      Sodium 137 mmol/L      Potassium 4.0 mmol/L      Chloride 107 mmol/L      CO2 21.0 mmol/L      Calcium 7.7 mg/dL      BUN/Creatinine Ratio 11.2     Anion Gap 9.0 mmol/L      eGFR 84.7 mL/min/1.73      Comment: National Kidney Foundation and American Society of Nephrology (ASN) Task Force recommended calculation based on the Chronic Kidney Disease Epidemiology Collaboration (CKD-EPI) equation refit without adjustment for race.       Narrative:      GFR Normal >60  Chronic Kidney Disease <60  Kidney Failure <15       CBC (No Diff) [[id number]]  (Abnormal) Collected: 04/14/22 0623    Specimen: Blood Updated: 04/14/22 0652     WBC 12.92 10*3/mm3      RBC 4.18 10*6/mm3      Hemoglobin 10.4 g/dL      Hematocrit 32.5 %      MCV 77.8 fL      MCH 24.9 pg      MCHC 32.0 g/dL      RDW 15.0 %      RDW-SD 41.8 fl      MPV 9.3 fL      Platelets 279 10*3/mm3     Tissue Pathology Exam [[id number]] Collected: 04/13/22 1004    Specimen: Tissue from Kidney, Left Updated: 04/13/22 1341    POC Pregnancy, Urine [[id number]]  (Normal) Collected: 04/13/22 0659    Specimen: Urine Updated: 04/13/22 0700     HCG, Urine, QL Negative     Lot Number [id number]     Internal Positive Control Passed     Internal Negative Control Passed     Expiration Date 07/31/2023          Imaging Results (All)     None          Condition on Discharge:    Excellent    Vital Signs:    /66 (BP Location: Left arm, Patient Position: Lying)   Pulse 66   Temp 98.7 °F (37.1 °C) (Oral)   Resp 18   LMP 03/24/2022   SpO2 97%     Discharge Disposition:    Home or Self Care    Discharge Medication:       Discharge Medications      New Medications      Instructions Start Date   acetaminophen 500 MG tablet  Commonly known as: TYLENOL   1,000 mg, Oral, Every 6 Hours      docusate sodium 100 MG capsule  Commonly known as: Colace   100 mg, Oral, 2 Times Daily, If taking percocet      oxyCODONE 5 MG immediate release tablet  Commonly known as: Roxicodone   5 mg, Oral, Every 6 Hours PRN             Discharge Diet:         Activity at Discharge:         Follow-up Appointments:    No future appointments.      Test Results Pending at Discharge:    Pending Labs     Order Current Status    Tissue Pathology Exam In process             Romel Broussard MD  04/14/22  13:32 EDT

## 2022-04-15 LAB
BH BB BLOOD EXPIRATION DATE: NORMAL
BH BB BLOOD EXPIRATION DATE: NORMAL
BH BB BLOOD TYPE BARCODE: 5100
BH BB BLOOD TYPE BARCODE: 5100
BH BB DISPENSE STATUS: NORMAL
BH BB DISPENSE STATUS: NORMAL
BH BB PRODUCT CODE: NORMAL
BH BB PRODUCT CODE: NORMAL
BH BB UNIT NUMBER: NORMAL
BH BB UNIT NUMBER: NORMAL
CROSSMATCH INTERPRETATION: NORMAL
CROSSMATCH INTERPRETATION: NORMAL
UNIT  ABO: NORMAL
UNIT  ABO: NORMAL
UNIT  RH: NORMAL
UNIT  RH: NORMAL

## 2022-04-16 LAB
LAB AP CASE REPORT: NORMAL
PATH REPORT.FINAL DX SPEC: NORMAL

## 2022-04-21 ENCOUNTER — TELEPHONE (OUTPATIENT)
Dept: UROLOGY | Facility: CLINIC | Age: 42
End: 2022-04-21

## 2022-04-21 NOTE — TELEPHONE ENCOUNTER
Provider: DR IRAHETA  Caller: FRED BENJAMIN  Relationship to Patient: SELF    Phone Number: [phone]  Reason for Call: PT CALLED IN REGARDS TO A POST-OP APPT, NO FOLLOW UP INDICATED IN THE DISCHARGE SUMMARY. PLEASE GIVE PT A CALL TO SCHEDULE THAT APPT, SHE SAYS SHE HAS STITCHES THAT NEED TO BE REMOVED.    PT NEEDS

## 2022-05-02 ENCOUNTER — OFFICE VISIT (OUTPATIENT)
Dept: UROLOGY | Facility: CLINIC | Age: 42
End: 2022-05-02

## 2022-05-02 VITALS
TEMPERATURE: 97.2 F | SYSTOLIC BLOOD PRESSURE: 126 MMHG | DIASTOLIC BLOOD PRESSURE: 83 MMHG | HEART RATE: 70 BPM | HEIGHT: 63 IN | OXYGEN SATURATION: 98 % | WEIGHT: 220 LBS | BODY MASS INDEX: 38.98 KG/M2

## 2022-05-02 DIAGNOSIS — C64.2 RENAL CELL CARCINOMA OF LEFT KIDNEY: Primary | ICD-10-CM

## 2022-05-02 PROCEDURE — 99213 OFFICE O/P EST LOW 20 MIN: CPT | Performed by: UROLOGY

## 2022-05-02 NOTE — PROGRESS NOTES
"Chief Complaint   Patient presents with   • Post-op        HPI  Ms. Verma is a 41 y.o. female with history below in assessment, who presents for follow up.     At this visit patient is doing well.  She has mild pain occasionally with twisting with her left lower quadrant incision.    Past Medical History:   Diagnosis Date   • Lab test positive for detection of COVID-19 virus 01/2021    was tested in [facility].  got pneumonia secondary.   • Pneumonia 01/2021    secondary to COVID       Past Surgical History:   Procedure Laterality Date   • NEPHRECTOMY Left 4/13/2022    Procedure: NEPHRECTOMY LAPAROSCOPIC HAND ASSISTED;  Surgeon: Romel Broussard MD;  Location: [facility];  Service: Urology;  Laterality: Left;   • TUBAL ABDOMINAL LIGATION           Current Outpatient Medications:   •  docusate sodium (Colace) 100 MG capsule, Take 1 capsule by mouth 2 (Two) Times a Day If taking oxycodone [place] 1 cápsula por vía oral 2 (dos) veces al día si elias oxicodona, Disp: 15 capsule, Rfl: 1  •  oxyCODONE (Roxicodone) 5 MG immediate release tablet, Take 1 tablet by mouth Every 6 (Six) Hours As Needed for Moderate or Severe Pain . [place] 1 tableta por vía oral cada 6 (seis) horas según sea necesario para el dolor moderado o severo., Disp: 10 tablet, Rfl: 0     Physical Exam  Visit Vitals  /83   Pulse 70   Temp 97.2 °F (36.2 °C)   Ht 160 cm (63\")   Wt 99.8 kg (220 lb)   SpO2 98%   BMI 38.97 kg/m²     Incisions are all healing well.  No abdominal pain or tenderness.    Labs  Brief Urine Lab Results  (Last result in the past 365 days)      Color   Clarity   Blood   Leuk Est   Nitrite   Protein   CREAT   Urine HCG        04/13/22 0659               Negative             Lab Results   Component Value Date    GLUCOSE 120 (H) 04/14/2022    CALCIUM 7.7 (L) 04/14/2022     04/14/2022    K 4.0 04/14/2022    CO2 21.0 (L) 04/14/2022     04/14/2022    BUN 10 04/14/2022    CREATININE 0.89 04/14/2022    EGFRIFAFRI " 121 01/23/2021    EGFRIFNONA 100 01/23/2021    BCR 11.2 04/14/2022    ANIONGAP 9.0 04/14/2022       Lab Results   Component Value Date    WBC 12.92 (H) 04/14/2022    HGB 10.4 (L) 04/14/2022    HCT 32.5 (L) 04/14/2022    MCV 77.8 (L) 04/14/2022     04/14/2022            Radiographic Studies  No Images in the past 120 days found..        I have reviewed the above labs and imaging.     Assessment  41 y.o. female with large left renal mass, status post left laparoscopic nephrectomy on 4/13/2022.  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  This is a new diagnosis of uncertain long-term prognosis, though favorable since the entire tumor and kidney were removed with negative margins.  Chance of recurrence is low.    Plan  1.  Follow-up in 6 months with repeat CT chest abdomen pelvis per NCCN guidelines.

## 2022-07-26 ENCOUNTER — APPOINTMENT (OUTPATIENT)
Dept: ULTRASOUND IMAGING | Facility: HOSPITAL | Age: 42
End: 2022-07-26

## 2022-07-26 ENCOUNTER — HOSPITAL ENCOUNTER (EMERGENCY)
Facility: HOSPITAL | Age: 42
Discharge: HOME | End: 2022-07-26
Attending: EMERGENCY MEDICINE | Admitting: EMERGENCY MEDICINE

## 2022-07-26 ENCOUNTER — APPOINTMENT (OUTPATIENT)
Dept: CT IMAGING | Facility: HOSPITAL | Age: 42
End: 2022-07-26

## 2022-07-26 VITALS
SYSTOLIC BLOOD PRESSURE: 132 MMHG | HEIGHT: 63 IN | HEART RATE: 84 BPM | WEIGHT: 216.8 LBS | BODY MASS INDEX: 38.41 KG/M2 | RESPIRATION RATE: 18 BRPM | DIASTOLIC BLOOD PRESSURE: 63 MMHG | TEMPERATURE: 97.8 F | OXYGEN SATURATION: 99 %

## 2022-07-26 DIAGNOSIS — R10.32 LEFT LOWER QUADRANT ABDOMINAL PAIN: Primary | ICD-10-CM

## 2022-07-26 LAB
ALBUMIN SERPL-MCNC: 4.2 G/DL (ref 3.5–5.2)
ALBUMIN/GLOB SERPL: 1.2 G/DL
ALP SERPL-CCNC: 111 U/L (ref 39–117)
ALT SERPL W P-5'-P-CCNC: 24 U/L (ref 1–33)
ANION GAP SERPL CALCULATED.3IONS-SCNC: 12.9 MMOL/L (ref 5–15)
AST SERPL-CCNC: 22 U/L (ref 1–32)
B-HCG UR QL: NEGATIVE
BASOPHILS # BLD AUTO: 0.07 10*3/MM3 (ref 0–0.2)
BASOPHILS NFR BLD AUTO: 0.6 % (ref 0–1.5)
BILIRUB SERPL-MCNC: 0.3 MG/DL (ref 0–1.2)
BILIRUB UR QL STRIP: NEGATIVE
BUN SERPL-MCNC: 14 MG/DL (ref 6–20)
BUN/CREAT SERPL: 16.3 (ref 7–25)
CALCIUM SPEC-SCNC: 8.8 MG/DL (ref 8.6–10.5)
CHLORIDE SERPL-SCNC: 105 MMOL/L (ref 98–107)
CLARITY UR: CLEAR
CO2 SERPL-SCNC: 23.1 MMOL/L (ref 22–29)
COLOR UR: YELLOW
CREAT SERPL-MCNC: 0.86 MG/DL (ref 0.57–1)
D-LACTATE SERPL-SCNC: 1.1 MMOL/L (ref 0.5–2)
DEPRECATED RDW RBC AUTO: 40.3 FL (ref 37–54)
EGFRCR SERPLBLD CKD-EPI 2021: 87.7 ML/MIN/1.73
EOSINOPHIL # BLD AUTO: 0.23 10*3/MM3 (ref 0–0.4)
EOSINOPHIL NFR BLD AUTO: 2.1 % (ref 0.3–6.2)
ERYTHROCYTE [DISTWIDTH] IN BLOOD BY AUTOMATED COUNT: 14.6 % (ref 12.3–15.4)
GLOBULIN UR ELPH-MCNC: 3.4 GM/DL
GLUCOSE SERPL-MCNC: 127 MG/DL (ref 65–99)
GLUCOSE UR STRIP-MCNC: NEGATIVE MG/DL
HCT VFR BLD AUTO: 36.6 % (ref 34–46.6)
HGB BLD-MCNC: 11.7 G/DL (ref 12–15.9)
HGB UR QL STRIP.AUTO: NEGATIVE
HOLD SPECIMEN: NORMAL
HOLD SPECIMEN: NORMAL
IMM GRANULOCYTES # BLD AUTO: 0.03 10*3/MM3 (ref 0–0.05)
IMM GRANULOCYTES NFR BLD AUTO: 0.3 % (ref 0–0.5)
KETONES UR QL STRIP: NEGATIVE
LEUKOCYTE ESTERASE UR QL STRIP.AUTO: NEGATIVE
LIPASE SERPL-CCNC: 33 U/L (ref 13–60)
LYMPHOCYTES # BLD AUTO: 4.12 10*3/MM3 (ref 0.7–3.1)
LYMPHOCYTES NFR BLD AUTO: 37.9 % (ref 19.6–45.3)
MCH RBC QN AUTO: 24.6 PG (ref 26.6–33)
MCHC RBC AUTO-ENTMCNC: 32 G/DL (ref 31.5–35.7)
MCV RBC AUTO: 76.9 FL (ref 79–97)
MONOCYTES # BLD AUTO: 0.62 10*3/MM3 (ref 0.1–0.9)
MONOCYTES NFR BLD AUTO: 5.7 % (ref 5–12)
NEUTROPHILS NFR BLD AUTO: 5.8 10*3/MM3 (ref 1.7–7)
NEUTROPHILS NFR BLD AUTO: 53.4 % (ref 42.7–76)
NITRITE UR QL STRIP: NEGATIVE
NRBC BLD AUTO-RTO: 0 /100 WBC (ref 0–0.2)
PH UR STRIP.AUTO: 6 [PH] (ref 5–8)
PLATELET # BLD AUTO: 379 10*3/MM3 (ref 140–450)
PMV BLD AUTO: 8.9 FL (ref 6–12)
POTASSIUM SERPL-SCNC: 4.1 MMOL/L (ref 3.5–5.2)
PROT SERPL-MCNC: 7.6 G/DL (ref 6–8.5)
PROT UR QL STRIP: ABNORMAL
RBC # BLD AUTO: 4.76 10*6/MM3 (ref 3.77–5.28)
SODIUM SERPL-SCNC: 141 MMOL/L (ref 136–145)
SP GR UR STRIP: 1.03 (ref 1–1.03)
UROBILINOGEN UR QL STRIP: ABNORMAL
WBC NRBC COR # BLD: 10.87 10*3/MM3 (ref 3.4–10.8)
WHOLE BLOOD HOLD COAG: NORMAL
WHOLE BLOOD HOLD SPECIMEN: NORMAL

## 2022-07-26 PROCEDURE — 25010000002 MORPHINE PER 10 MG: Performed by: EMERGENCY MEDICINE

## 2022-07-26 PROCEDURE — 99283 EMERGENCY DEPT VISIT LOW MDM: CPT

## 2022-07-26 PROCEDURE — 83690 ASSAY OF LIPASE: CPT | Performed by: EMERGENCY MEDICINE

## 2022-07-26 PROCEDURE — 80053 COMPREHEN METABOLIC PANEL: CPT | Performed by: EMERGENCY MEDICINE

## 2022-07-26 PROCEDURE — 96375 TX/PRO/DX INJ NEW DRUG ADDON: CPT

## 2022-07-26 PROCEDURE — 96374 THER/PROPH/DIAG INJ IV PUSH: CPT

## 2022-07-26 PROCEDURE — 83605 ASSAY OF LACTIC ACID: CPT | Performed by: EMERGENCY MEDICINE

## 2022-07-26 PROCEDURE — 76830 TRANSVAGINAL US NON-OB: CPT

## 2022-07-26 PROCEDURE — 25010000002 IOPAMIDOL 61 % SOLUTION: Performed by: EMERGENCY MEDICINE

## 2022-07-26 PROCEDURE — 81025 URINE PREGNANCY TEST: CPT | Performed by: EMERGENCY MEDICINE

## 2022-07-26 PROCEDURE — 85025 COMPLETE CBC W/AUTO DIFF WBC: CPT | Performed by: EMERGENCY MEDICINE

## 2022-07-26 PROCEDURE — 25010000002 ONDANSETRON PER 1 MG: Performed by: EMERGENCY MEDICINE

## 2022-07-26 PROCEDURE — 81003 URINALYSIS AUTO W/O SCOPE: CPT | Performed by: EMERGENCY MEDICINE

## 2022-07-26 PROCEDURE — 74177 CT ABD & PELVIS W/CONTRAST: CPT

## 2022-07-26 RX ORDER — SODIUM CHLORIDE 0.9 % (FLUSH) 0.9 %
10 SYRINGE (ML) INJECTION AS NEEDED
Status: DISCONTINUED | OUTPATIENT
Start: 2022-07-26 | End: 2022-07-26 | Stop reason: HOSPADM

## 2022-07-26 RX ORDER — ONDANSETRON 2 MG/ML
4 INJECTION INTRAMUSCULAR; INTRAVENOUS ONCE
Status: COMPLETED | OUTPATIENT
Start: 2022-07-26 | End: 2022-07-26

## 2022-07-26 RX ORDER — MORPHINE SULFATE 4 MG/ML
4 INJECTION, SOLUTION INTRAMUSCULAR; INTRAVENOUS ONCE
Status: COMPLETED | OUTPATIENT
Start: 2022-07-26 | End: 2022-07-26

## 2022-07-26 RX ORDER — OXYCODONE HYDROCHLORIDE AND ACETAMINOPHEN 5; 325 MG/1; MG/1
1 TABLET ORAL EVERY 6 HOURS PRN
Qty: 14 TABLET | Refills: 0 | Status: SHIPPED | OUTPATIENT
Start: 2022-07-26 | End: 2022-07-27

## 2022-07-26 RX ORDER — ONDANSETRON 4 MG/1
4 TABLET, ORALLY DISINTEGRATING ORAL EVERY 6 HOURS PRN
Qty: 10 TABLET | Refills: 0 | Status: SHIPPED | OUTPATIENT
Start: 2022-07-26 | End: 2022-07-27

## 2022-07-26 RX ADMIN — MORPHINE SULFATE 4 MG: 4 INJECTION, SOLUTION INTRAMUSCULAR; INTRAVENOUS at 04:08

## 2022-07-26 RX ADMIN — IOPAMIDOL 100 ML: 612 INJECTION, SOLUTION INTRAVENOUS at 04:35

## 2022-07-26 RX ADMIN — SODIUM CHLORIDE 1000 ML: 9 INJECTION, SOLUTION INTRAVENOUS at 04:08

## 2022-07-26 RX ADMIN — ONDANSETRON 4 MG: 2 INJECTION INTRAMUSCULAR; INTRAVENOUS at 04:08

## 2022-07-26 NOTE — ED PROVIDER NOTES
Subjective   42-year-old female presenting with abdominal pain.  History obtained via .  She states that yesterday started having some mild left-sided abdominal pain.  This is gotten progressively more severe over the course of the evening.  It is sharp and stabbing, there are no alleviating or aggravating factors.  She denies any fevers, chills, nausea, vomiting, chest pain, shortness of breath.  She notes that about 3 months ago had left nephrectomy.          Review of Systems   Constitutional: Negative.    HENT: Negative.    Eyes: Negative.    Respiratory: Negative.    Cardiovascular: Negative.    Gastrointestinal: Positive for abdominal pain.   Genitourinary: Negative.    Musculoskeletal: Negative.    Skin: Negative.    Neurological: Negative.    Psychiatric/Behavioral: Negative.        Past Medical History:   Diagnosis Date   • Lab test positive for detection of COVID-19 virus 01/2021    was tested in [facility].  got pneumonia secondary.   • Pneumonia 01/2021    secondary to COVID       No Known Allergies    Past Surgical History:   Procedure Laterality Date   • NEPHRECTOMY Left 4/13/2022    Procedure: NEPHRECTOMY LAPAROSCOPIC HAND ASSISTED;  Surgeon: Romel Broussard MD;  Location: [facility];  Service: Urology;  Laterality: Left;   • TUBAL ABDOMINAL LIGATION         Family History   Problem Relation Age of Onset   • Leukemia Father    • Diabetes Mother        Social History     Socioeconomic History   • Marital status: Single   Tobacco Use   • Smoking status: Never Smoker   • Smokeless tobacco: Never Used   Vaping Use   • Vaping Use: Never used   Substance and Sexual Activity   • Alcohol use: No   • Drug use: No   • Sexual activity: Defer           Objective   Physical Exam  Constitutional:       General: She is not in acute distress.     Appearance: Normal appearance. She is not ill-appearing, toxic-appearing or diaphoretic.   HENT:      Head: Normocephalic and atraumatic.       Right Ear: External ear normal.      Left Ear: External ear normal.      Nose: Nose normal.      Mouth/Throat:      Mouth: Mucous membranes are moist.      Pharynx: Oropharynx is clear.   Eyes:      Extraocular Movements: Extraocular movements intact.      Conjunctiva/sclera: Conjunctivae normal.      Pupils: Pupils are equal, round, and reactive to light.   Cardiovascular:      Rate and Rhythm: Normal rate and regular rhythm.      Pulses: Normal pulses.      Heart sounds: Normal heart sounds.   Pulmonary:      Effort: Pulmonary effort is normal. No respiratory distress.      Breath sounds: Normal breath sounds.   Abdominal:      General: Bowel sounds are normal. There is no distension.      Comments: Marked tenderness and guarding left lower quadrant   Musculoskeletal:         General: No swelling, tenderness or deformity. Normal range of motion.      Cervical back: Normal range of motion.   Skin:     General: Skin is warm and dry.      Capillary Refill: Capillary refill takes less than 2 seconds.      Findings: No rash.   Neurological:      General: No focal deficit present.      Mental Status: She is alert and oriented to person, place, and time.   Psychiatric:         Mood and Affect: Mood normal.         Behavior: Behavior normal.         Procedures           ED Course                                           MDM  Number of Diagnoses or Management Options  Left lower quadrant abdominal pain  Diagnosis management comments: 42-year-old female with abdominal pain.  Well-developed, well-nourished lady in no distress with exam as above.  Her vital signs are normal.  She does have fairly pronounced tenderness left lower quadrant.  Her exam is otherwise nonfocal.  Will obtain labs, urinalysis and CT scan.  Will give symptomatic treatment.  Disposition pending.    DDx: Postoperative complication, obstruction, abscess, UTI, diverticulitis, ovarian cyst    06:19 EDT Work-up thus far is relatively unremarkable.  Given  "the degree of her pain and tenderness will obtain pelvic ultrasound.  Disposition pending.    08:01 EDT ultrasound reveals a normal-appearing left ovary with \"no significant blood flow, I cannot exclude torsion\".  She has no pain at this time and is resting comfortably.  I discussed the findings with Dr. Landers, he recommended following up closely in office this week for repeat ultrasound.  Findings discussed at length with patient and significant other via .  Strict return precautions discussed.  They are comfortable with and understanding of the plan.      Final diagnoses:   Left lower quadrant abdominal pain        See Jara MD  07/26/22 0802    "

## 2022-07-27 ENCOUNTER — OFFICE VISIT (OUTPATIENT)
Dept: OBSTETRICS AND GYNECOLOGY | Facility: CLINIC | Age: 42
End: 2022-07-27

## 2022-07-27 VITALS
SYSTOLIC BLOOD PRESSURE: 130 MMHG | HEIGHT: 63 IN | BODY MASS INDEX: 38.27 KG/M2 | DIASTOLIC BLOOD PRESSURE: 64 MMHG | WEIGHT: 216 LBS

## 2022-07-27 DIAGNOSIS — N83.202 LEFT OVARIAN CYST: ICD-10-CM

## 2022-07-27 DIAGNOSIS — R10.32 LLQ PAIN: Primary | ICD-10-CM

## 2022-07-27 PROCEDURE — 99202 OFFICE O/P NEW SF 15 MIN: CPT | Performed by: OBSTETRICS & GYNECOLOGY

## 2022-07-28 ENCOUNTER — TRANSCRIBE ORDERS (OUTPATIENT)
Dept: ADMINISTRATIVE | Facility: HOSPITAL | Age: 42
End: 2022-07-28

## 2022-07-28 DIAGNOSIS — Z12.31 ENCOUNTER FOR SCREENING MAMMOGRAM FOR MALIGNANT NEOPLASM OF BREAST: Primary | ICD-10-CM

## 2022-08-03 NOTE — PROGRESS NOTES
"GYN Office Visit    Subjective   Chief Complaint   Patient presents with   • Pelvic Pain     TVS done today, left side pain.     Debbie Su is a 42 y.o. year old  presenting to be seen for pelvic pain.    She reports mild left lower quadrant pain.  Previous left nephrectomy.  ER visit yesterday with reassuring imaging.    OB Hx:  x4  Pap smear: not sure    Past Medical History:   Diagnosis Date   • Lab test positive for detection of COVID-19 virus 2021    was tested in [facility].  got pneumonia secondary.   • Pneumonia 2021    secondary to COVID       Past Surgical History:   Procedure Laterality Date   • NEPHRECTOMY Left 2022    Procedure: NEPHRECTOMY LAPAROSCOPIC HAND ASSISTED;  Surgeon: Romel Broussard MD;  Location: [facility];  Service: Urology;  Laterality: Left;   • TUBAL ABDOMINAL LIGATION         Family History   Problem Relation Age of Onset   • Leukemia Father    • Diabetes Mother         Social History     Tobacco Use   • Smoking status: Never Smoker   • Smokeless tobacco: Never Used   Vaping Use   • Vaping Use: Never used   Substance Use Topics   • Alcohol use: No   • Drug use: No       (Not in a hospital admission)      Patient has no known allergies.    No current outpatient medications on file prior to visit.     No current facility-administered medications on file prior to visit.       Social History    Tobacco Use      Smoking status: Never Smoker      Smokeless tobacco: Never Used         Objective   /64   Ht 160 cm (63\")   Wt 98 kg (216 lb)   LMP 2022   BMI 38.26 kg/m²     Physical Exam:  General Appearance: alert, pleasant, appears stated age, interactive and cooperative         Medical Decision Making:    I reviewed the CT scan and ultrasound from the ER visit yesterday.  I reviewed her ultrasound from today.    Ultrasound:  Mildly enlarged, anteverted uterus with no masses.  The uterine length measures 10.5 cm.  The endometrium measures " 13.5 mm.  The right ovary is normal in appearance.  The left ovary contains a 4 cm simple cyst.  Both ovaries have normal vascularity.  Small amount of free fluid in the cul-de-sac.    Assessment   Left lower quadrant pain  Left ovarian cyst, simple     Plan    Orders Placed This Encounter   Procedures   • US Non-ob Transvaginal     Order Specific Question:   Reason for Exam:     Answer:   left ov pain       Medication Management: None    Procedures Performed: None    We reviewed her situation in detail today.  We reviewed her ultrasound imaging in detail.  I recommend continued expectant management for now.  The simple ovarian cyst is likely to resolve without intervention.  Call/return precautions reviewed.    RTC in 3 months for repeat ultrasound and Pap smear    Jhonatan Campuzano MD  Obstetrics and Gynecology  [facility]

## 2022-08-09 ENCOUNTER — APPOINTMENT (OUTPATIENT)
Dept: MAMMOGRAPHY | Facility: HOSPITAL | Age: 42
End: 2022-08-09

## 2022-10-26 ENCOUNTER — APPOINTMENT (OUTPATIENT)
Dept: CT IMAGING | Facility: HOSPITAL | Age: 42
End: 2022-10-26

## 2022-11-02 ENCOUNTER — OFFICE VISIT (OUTPATIENT)
Dept: OBSTETRICS AND GYNECOLOGY | Facility: CLINIC | Age: 42
End: 2022-11-02

## 2022-11-02 VITALS
BODY MASS INDEX: 37.74 KG/M2 | SYSTOLIC BLOOD PRESSURE: 128 MMHG | HEIGHT: 63 IN | DIASTOLIC BLOOD PRESSURE: 78 MMHG | WEIGHT: 213 LBS

## 2022-11-02 DIAGNOSIS — N83.202 LEFT OVARIAN CYST: Primary | ICD-10-CM

## 2022-11-02 DIAGNOSIS — Z12.4 SCREENING FOR MALIGNANT NEOPLASM OF CERVIX: ICD-10-CM

## 2022-11-02 PROCEDURE — 99213 OFFICE O/P EST LOW 20 MIN: CPT | Performed by: OBSTETRICS & GYNECOLOGY

## 2022-11-04 LAB — REF LAB TEST METHOD: NORMAL

## 2022-11-10 NOTE — PROGRESS NOTES
"GYN Office Visit    Subjective   Chief Complaint   Patient presents with   • Follow-up     TVS done today for 2 month follow up ovarian cyst     Debbie Su is a 42 y.o. year old  presenting to be seen for follow-up ovarian cyst.    No significant pain symptoms today.  Repeat ultrasound today.    OB Hx:  x4  Pap smear: not sure  Contraception: BTL    Past Medical History:   Diagnosis Date   • Lab test positive for detection of COVID-19 virus 2021    was tested in [facility].  got pneumonia secondary.   • Pneumonia 2021    secondary to COVID       Past Surgical History:   Procedure Laterality Date   • NEPHRECTOMY Left 2022    Procedure: NEPHRECTOMY LAPAROSCOPIC HAND ASSISTED;  Surgeon: Romel Broussard MD;  Location: [facility];  Service: Urology;  Laterality: Left;   • TUBAL ABDOMINAL LIGATION         Family History   Problem Relation Age of Onset   • Leukemia Father    • Diabetes Mother         Social History     Tobacco Use   • Smoking status: Never   • Smokeless tobacco: Never   Vaping Use   • Vaping Use: Never used   Substance Use Topics   • Alcohol use: No   • Drug use: No       (Not in a hospital admission)      Patient has no known allergies.    No current outpatient medications on file prior to visit.     No current facility-administered medications on file prior to visit.       Social History    Tobacco Use      Smoking status: Never      Smokeless tobacco: Never         Objective   /78   Ht 160 cm (63\")   Wt 96.6 kg (213 lb)   BMI 37.73 kg/m²     Physical Exam:  General Appearance: alert, pleasant, appears stated age, interactive and cooperative         Medical Decision Making:    I reviewed the CT scan and ultrasound from the ER visit.  I reviewed her ultrasound from today.    Ultrasound:  Mildly enlarged, anteverted uterus with no masses.  The uterine length measures 10.1 cm.  The endometrium measures 5.3 mm.  A small amount of fluid is noted within the " endometrial cavity.  The left ovary is normal in appearance.  The right ovary contains a 2 cm follicle.  Both ovaries have normal vascularity.  No free fluid in the cul-de-sac.    Assessment   Left ovarian cyst, resolved  Screening for malignancy the cervix     Plan    No orders of the defined types were placed in this encounter.      Medication Management: None    Procedures Performed: Pap smear    We reviewed her situation in detail today.  The left ovarian cyst has resolved on the ultrasound today.  She has no pain symptoms.  A Pap smear was collected today.    Return to clinic for annual visits.    Jhonatan Campuzano MD  Obstetrics and Gynecology  [facility]

## 2022-11-15 ENCOUNTER — APPOINTMENT (OUTPATIENT)
Dept: CT IMAGING | Facility: HOSPITAL | Age: 42
End: 2022-11-15

## 2022-12-08 ENCOUNTER — APPOINTMENT (OUTPATIENT)
Dept: CT IMAGING | Facility: HOSPITAL | Age: 42
End: 2022-12-08

## 2023-02-21 ENCOUNTER — TELEPHONE (OUTPATIENT)
Dept: UROLOGY | Facility: CLINIC | Age: 43
End: 2023-02-21

## 2023-02-22 ENCOUNTER — HOSPITAL ENCOUNTER (OUTPATIENT)
Dept: CT IMAGING | Facility: HOSPITAL | Age: 43
Discharge: HOME | End: 2023-02-22
Admitting: UROLOGY
Payer: COMMERCIAL

## 2023-02-22 ENCOUNTER — APPOINTMENT (OUTPATIENT)
Dept: CT IMAGING | Facility: HOSPITAL | Age: 43
End: 2023-02-22
Payer: COMMERCIAL

## 2023-02-22 DIAGNOSIS — C64.2 RENAL CELL CARCINOMA OF LEFT KIDNEY: ICD-10-CM

## 2023-02-22 PROCEDURE — 71260 CT THORAX DX C+: CPT

## 2023-02-22 PROCEDURE — 25010000002 IOPAMIDOL 61 % SOLUTION: Performed by: UROLOGY

## 2023-02-22 RX ADMIN — IOPAMIDOL 100 ML: 612 INJECTION, SOLUTION INTRAVENOUS at 16:49

## 2023-03-30 ENCOUNTER — TELEPHONE (OUTPATIENT)
Dept: UROLOGY | Facility: CLINIC | Age: 43
End: 2023-03-30
Payer: COMMERCIAL

## 2023-03-30 NOTE — TELEPHONE ENCOUNTER
Hub staff attempted to follow warm transfer process and was unsuccessful     Caller: CARSON    Relationship to patient: [facility] AUTH DEPT    Best call back number: [phone]    Patient is needing: CARSON WAS CALLING IN REGARDS TO AN AUTH FOR CT SCAN THAT WAS DENIED. IT IS SCHEDULED FOR TOMORROW SO NEEDS TO BE ADDRESSED ASAP.

## 2023-05-11 ENCOUNTER — HOSPITAL ENCOUNTER (OUTPATIENT)
Dept: MAMMOGRAPHY | Facility: HOSPITAL | Age: 43
Discharge: HOME | End: 2023-05-11
Admitting: NURSE PRACTITIONER
Payer: COMMERCIAL

## 2023-05-11 DIAGNOSIS — Z12.31 ENCOUNTER FOR SCREENING MAMMOGRAM FOR MALIGNANT NEOPLASM OF BREAST: ICD-10-CM

## 2023-05-11 PROCEDURE — 77067 SCR MAMMO BI INCL CAD: CPT

## 2023-05-11 PROCEDURE — 77063 BREAST TOMOSYNTHESIS BI: CPT

## 2023-05-17 ENCOUNTER — TELEPHONE (OUTPATIENT)
Dept: UROLOGY | Facility: CLINIC | Age: 43
End: 2023-05-17
Payer: COMMERCIAL

## 2023-05-24 ENCOUNTER — TELEPHONE (OUTPATIENT)
Dept: UROLOGY | Facility: CLINIC | Age: 43
End: 2023-05-24
Payer: COMMERCIAL

## 2023-05-24 NOTE — TELEPHONE ENCOUNTER
Mailed patient a letter explaining the reason that her insurance denied CT scan due to insurance has lapsed. I instructed patient to bring a new insurance card to our office.

## 2025-07-02 ENCOUNTER — HOSPITAL ENCOUNTER (EMERGENCY)
Facility: HOSPITAL | Age: 45
Discharge: HOME | End: 2025-07-02
Attending: STUDENT IN AN ORGANIZED HEALTH CARE EDUCATION/TRAINING PROGRAM | Admitting: STUDENT IN AN ORGANIZED HEALTH CARE EDUCATION/TRAINING PROGRAM
Payer: MEDICAID

## 2025-07-02 ENCOUNTER — APPOINTMENT (OUTPATIENT)
Dept: GENERAL RADIOLOGY | Facility: HOSPITAL | Age: 45
End: 2025-07-02
Payer: MEDICAID

## 2025-07-02 VITALS
RESPIRATION RATE: 20 BRPM | WEIGHT: 217 LBS | DIASTOLIC BLOOD PRESSURE: 83 MMHG | SYSTOLIC BLOOD PRESSURE: 135 MMHG | HEART RATE: 72 BPM | HEIGHT: 61 IN | TEMPERATURE: 98.1 F | BODY MASS INDEX: 40.97 KG/M2 | OXYGEN SATURATION: 98 %

## 2025-07-02 DIAGNOSIS — N28.89 RENAL MASS: ICD-10-CM

## 2025-07-02 DIAGNOSIS — M79.671 RIGHT FOOT PAIN: Primary | ICD-10-CM

## 2025-07-02 PROCEDURE — 63710000001 ONDANSETRON ODT 4 MG TABLET DISPERSIBLE: Performed by: STUDENT IN AN ORGANIZED HEALTH CARE EDUCATION/TRAINING PROGRAM

## 2025-07-02 PROCEDURE — 99283 EMERGENCY DEPT VISIT LOW MDM: CPT | Performed by: STUDENT IN AN ORGANIZED HEALTH CARE EDUCATION/TRAINING PROGRAM

## 2025-07-02 PROCEDURE — 73630 X-RAY EXAM OF FOOT: CPT

## 2025-07-02 PROCEDURE — 96372 THER/PROPH/DIAG INJ SC/IM: CPT

## 2025-07-02 PROCEDURE — 25010000002 KETOROLAC TROMETHAMINE PER 15 MG: Performed by: STUDENT IN AN ORGANIZED HEALTH CARE EDUCATION/TRAINING PROGRAM

## 2025-07-02 RX ORDER — OXYCODONE AND ACETAMINOPHEN 5; 325 MG/1; MG/1
1 TABLET ORAL ONCE
Refills: 0 | Status: COMPLETED | OUTPATIENT
Start: 2025-07-02 | End: 2025-07-02

## 2025-07-02 RX ORDER — CYCLOBENZAPRINE HCL 10 MG
10 TABLET ORAL ONCE
Status: COMPLETED | OUTPATIENT
Start: 2025-07-02 | End: 2025-07-02

## 2025-07-02 RX ORDER — MELOXICAM 7.5 MG/1
15 TABLET ORAL DAILY
Qty: 14 TABLET | Refills: 0 | Status: SHIPPED | OUTPATIENT
Start: 2025-07-02 | End: 2025-07-09

## 2025-07-02 RX ORDER — KETOROLAC TROMETHAMINE 30 MG/ML
30 INJECTION, SOLUTION INTRAMUSCULAR; INTRAVENOUS ONCE
Status: COMPLETED | OUTPATIENT
Start: 2025-07-02 | End: 2025-07-02

## 2025-07-02 RX ORDER — ONDANSETRON 4 MG/1
4 TABLET, ORALLY DISINTEGRATING ORAL ONCE
Status: COMPLETED | OUTPATIENT
Start: 2025-07-02 | End: 2025-07-02

## 2025-07-02 RX ORDER — CYCLOBENZAPRINE HCL 10 MG
10 TABLET ORAL 3 TIMES DAILY PRN
Qty: 12 TABLET | Refills: 0 | Status: SHIPPED | OUTPATIENT
Start: 2025-07-02

## 2025-07-02 RX ADMIN — ONDANSETRON 4 MG: 4 TABLET, ORALLY DISINTEGRATING ORAL at 01:46

## 2025-07-02 RX ADMIN — CYCLOBENZAPRINE HYDROCHLORIDE 10 MG: 10 TABLET, FILM COATED ORAL at 01:46

## 2025-07-02 RX ADMIN — KETOROLAC TROMETHAMINE 30 MG: 30 INJECTION, SOLUTION INTRAMUSCULAR; INTRAVENOUS at 01:46

## 2025-07-02 RX ADMIN — OXYCODONE HYDROCHLORIDE AND ACETAMINOPHEN 1 TABLET: 5; 325 TABLET ORAL at 01:46

## 2025-07-02 NOTE — ED PROVIDER NOTES
EMERGENCY DEPARTMENT ENCOUNTER    Pt Name: Debbie Su  MRN: [id number]  Pt :   1980  Room Number:  01SF  Date of encounter:  2025  PCP: Provider, No Known  ED Provider: Jimbo Espinoza PA-C    Historian: Patient, nursing notes      HPI:  Chief Complaint: Foot pain        Context: Debbie Su is a 45 y.o. female who presents to the ED c/o pain in the dorsal part of her right foot.  Patient states this pain started suddenly today while at work.  Patient does state that she has had off and on pain in her right foot over the past 3 years.  She denies any fall to the ground or direct trauma or injury to the foot.  She denies any ankle pain, leg swelling, calf tenderness, and denies history of DVT or PE.      PAST MEDICAL HISTORY  Past Medical History:   Diagnosis Date    Lab test positive for detection of COVID-19 virus 2021    was tested in [facility].  got pneumonia secondary.    Pneumonia 2021    secondary to COVID         PAST SURGICAL HISTORY  Past Surgical History:   Procedure Laterality Date    NEPHRECTOMY Left 2022    Procedure: NEPHRECTOMY LAPAROSCOPIC HAND ASSISTED;  Surgeon: Romel Broussard MD;  Location: [facility];  Service: Urology;  Laterality: Left;    TUBAL ABDOMINAL LIGATION           FAMILY HISTORY  Family History   Problem Relation Age of Onset    Leukemia Father     Diabetes Mother          SOCIAL HISTORY  Social History     Socioeconomic History    Marital status: Single   Tobacco Use    Smoking status: Never    Smokeless tobacco: Never   Vaping Use    Vaping status: Never Used   Substance and Sexual Activity    Alcohol use: No    Drug use: No    Sexual activity: Defer         ALLERGIES  Patient has no known allergies.        REVIEW OF SYSTEMS  Review of Systems   Constitutional:  Negative for chills and fever.   HENT:  Negative for congestion and sore throat.    Respiratory:  Negative for cough and shortness of breath.    Cardiovascular:   Negative for chest pain.   Gastrointestinal:  Negative for abdominal pain, nausea and vomiting.   Genitourinary:  Negative for dysuria.   Musculoskeletal:  Negative for back pain.        Right foot pain   Skin:  Negative for wound.   Neurological:  Negative for dizziness and headaches.   Psychiatric/Behavioral:  Negative for confusion.    All other systems reviewed and are negative.         All systems reviewed and negative except for those discussed in HPI.       PHYSICAL EXAM    I have reviewed the triage vital signs and nursing notes.    ED Triage Vitals [07/02/25 0046]   Temp Heart Rate Resp BP SpO2   98.1 °F (36.7 °C) 72 20 135/83 98 %      Temp src Heart Rate Source Patient Position BP Location FiO2 (%)   Oral Monitor Sitting Left arm --       Physical Exam  Vitals and nursing note reviewed.   Constitutional:       General: She is not in acute distress.     Appearance: She is not ill-appearing, toxic-appearing or diaphoretic.   HENT:      Head: Normocephalic and atraumatic.      Mouth/Throat:      Mouth: Mucous membranes are moist.      Pharynx: Oropharynx is clear.   Eyes:      Extraocular Movements: Extraocular movements intact.   Cardiovascular:      Rate and Rhythm: Normal rate.      Pulses:           Dorsalis pedis pulses are 2+ on the right side.        Posterior tibial pulses are 2+ on the right side.      Heart sounds: Normal heart sounds.   Pulmonary:      Effort: Pulmonary effort is normal. No respiratory distress.      Breath sounds: Normal breath sounds.   Abdominal:      Tenderness: There is no abdominal tenderness.   Feet:      Right foot:      Skin integrity: No erythema or warmth.      Comments: Right foot noticeably tender over the dorsal aspect, no erythema no warmth no wounds normal capillary refill easily palpable DP and PT pulses, no significant edema  Skin:     General: Skin is warm and dry.      Findings: No rash.   Neurological:      Mental Status: She is alert.             LAB  RESULTS  No results found for this or any previous visit (from the past 24 hours).    If labs were ordered, I independently reviewed the results and considered them in treating the patient.        RADIOLOGY  No Radiology Exams Resulted Within Past 24 Hours    I ordered and independently reviewed the above noted radiographic studies.      I viewed images of right foot x-ray which showed no fracture per my independent interpretation.    See radiologist's dictation for official interpretation.        PROCEDURES    Procedures    No orders to display       MEDICATIONS GIVEN IN ER    Medications   oxyCODONE-acetaminophen (PERCOCET) 5-325 MG per tablet 1 tablet (1 tablet Oral Given 7/2/25 0146)   ondansetron ODT (ZOFRAN-ODT) disintegrating tablet 4 mg (4 mg Oral Given 7/2/25 0146)   ketorolac (TORADOL) injection 30 mg (30 mg Intramuscular Given 7/2/25 0146)   cyclobenzaprine (FLEXERIL) tablet 10 mg (10 mg Oral Given 7/2/25 0146)         MEDICAL DECISION MAKING, PROGRESS, and CONSULTS    All labs, if obtained, have been independently reviewed by me.  All radiology studies, if obtained, have been reviewed by me and the radiologist dictating the report.  All EKG's, if obtained, have been independently viewed and interpreted by me/my attending physician.      Discussion below represents my analysis of pertinent findings related to patient's condition, differential diagnosis, treatment plan and final disposition.    Patient is a 45-year-old female presenting to ER today for evaluation of right foot pain that started suddenly.  On evaluation the patient's right foot is neurovascularly intact, is noticeably tender over the dorsal aspect, there is no reduction in range of motion of the ankle, no significant edema, easily palpable pulses.  No significant swelling of the extremities unilaterally, no clinical suspicion for DVT or critical limb ischemia given easily palpable pulses.  X-ray was obtained showing no obvious fracture.   Patient was given Toradol and pain medication and on reevaluation after an hour and 45 minutes of observation her pain did much improve.  On reevaluation she can bear weight.  I feel she is now stable for discharge with outpatient podiatry follow-up for further evaluation of her pain will prescribe anti-inflammatories and muscle relaxers and encourage her to use a postop shoe and rest ice and elevate the foot at home.  Work note was provided.  Strict ED return precautions were explained and the patient verbalized understanding and agreement with this plan of care.  Hospital.  Maltese  used throughout the entirety of the ED course.                       Differential diagnosis:    Differential diagnosis included but was not limited to muscle spasm, plantar fasciitis, arthritis, foot sprain, ligamentous injury, fracture      Additional sources:    - Discussed/ obtained information from independent historians: Patient's  at bedside    - External (non-ED) record review: Previous medical records reviewed    - Chronic or social conditions impacting care: Language barrier as patient is a primary Maltese speaker, this barrier was overcome using hospital approved Maltese  throughout the entirety of the ED course    Orders placed during this visit:  Orders Placed This Encounter   Procedures    [facility] 12. Post Op Shoe (); Right    XR Foot 3+ View Right    Ambulatory Referral to Podiatry    Obtain & Apply The Following- Lower extremity; Post-op shoe         Additional orders considered but not ordered: None      ED Course:    Consultants: None                Shared Decision Making:  After my consideration of clinical presentation and any laboratory/radiology studies obtained, I discussed the findings with the patient/patient representative who is in agreement with the treatment plan and the final disposition.   Risks and benefits of discharge and/or  observation/admission were discussed.       AS OF 02:31 EDT VITALS:    BP - 135/83  HR - 72  TEMP - 98.1 °F (36.7 °C) (Oral)  O2 SATS - 98%                  DIAGNOSIS  Final diagnoses:   Right foot pain   Renal mass         DISPOSITION  Discharge      Please note that portions of this document were completed with voice recognition software.      Jimbo Espinoza PA-C  07/02/25 0231

## 2025-07-02 NOTE — DISCHARGE INSTRUCTIONS
Rest ice and elevate your foot when you are at home or home and take your medications as prescribed.  Call the office of Dr. Dickey tomorrow and schedule an appointment as soon as possible.  Return to the ER for any acute changes or worsening of your foot pain.

## 2025-07-02 NOTE — Clinical Note
[facility] EMERGENCY DEPARTMENT  [address] [address]  Phone: [phone]    Debbie Su was seen and treated in our emergency department on 7/2/2025.  She may return to work on 07/07/2025.         Thank you for choosing [facility].    Jimbo Espinoza PA-C

## (undated) DEVICE — DISPOSABLE MONOPOLAR ENDOSCOPIC CORD 10 FT. (3M): Brand: KIRWAN

## (undated) DEVICE — MARKR SKIN W/RULR

## (undated) DEVICE — TUBING, SUCTION, 1/4" X 12', STRAIGHT: Brand: MEDLINE

## (undated) DEVICE — TBG PENCL TELESCP MEGADYNE SMOKE EVAC 10FT

## (undated) DEVICE — APPL COTN TP PLSTC 6IN STRL LF PK/2

## (undated) DEVICE — 2, DISPOSABLE SUCTION/IRRIGATOR WITHOUT DISPOSABLE TIP: Brand: STRYKEFLOW

## (undated) DEVICE — SUT VIC 3/0 SH 27IN J416H

## (undated) DEVICE — ANTIBACTERIAL VIOLET BRAIDED (POLYGLACTIN 910), SYNTHETIC ABSORBABLE SUTURE: Brand: COATED VICRYL

## (undated) DEVICE — GLV SURG SENSICARE PI LF PF 7.5 GRN STRL

## (undated) DEVICE — PK BARIATRIC 10

## (undated) DEVICE — APPL HEMOS FOR DELIVERY FLOSEAL

## (undated) DEVICE — TP ELECTRD LAP L WR SPLIT33CM

## (undated) DEVICE — MONOPOLAR METZENBAUM SCISSOR, MINI BLADE TIP, DISPOSABLE: Brand: MONOPOLAR METZENBAUM SCISSOR, MINI BLADE TIP, DISPOSABLE

## (undated) DEVICE — ENDOPATH XCEL UNIVERSAL TROCAR STABLILITY SLEEVES: Brand: ENDOPATH XCEL

## (undated) DEVICE — ENSEAL X1 TISSUE SEALER, CURVED JAW, 37 CM SHAFT LENGTH: Brand: ENSEAL

## (undated) DEVICE — SLV SCD CALF HEMOFORCE DVT THERP REPROC MD

## (undated) DEVICE — TOTAL TRAY, 16FR 10ML SIL FOLEY, URN: Brand: MEDLINE

## (undated) DEVICE — PROXIMATE SKIN STAPLERS (35 WIDE) CONTAINS 35 STAINLESS STEEL STAPLES (FIXED HEAD): Brand: PROXIMATE

## (undated) DEVICE — ENDOPATH XCEL BLADELESS TROCARS WITH STABILITY SLEEVES: Brand: ENDOPATH XCEL

## (undated) DEVICE — TP SILK DURAPORE 2 IN

## (undated) DEVICE — TOWEL,OR,DSP,ST,BLUE,STD,4/PK,20PK/CS: Brand: MEDLINE

## (undated) DEVICE — [HIGH FLOW INSUFFLATOR,  DO NOT USE IF PACKAGE IS DAMAGED,  KEEP DRY,  KEEP AWAY FROM SUNLIGHT,  PROTECT FROM HEAT AND RADIOACTIVE SOURCES.]: Brand: PNEUMOSURE

## (undated) DEVICE — PAD ARMBRD SURG CONVOL 7.5X20X2IN

## (undated) DEVICE — SKIN AFFIX SURG ADHESIVE 72/CS 0.55ML: Brand: MEDLINE

## (undated) DEVICE — ANTIBACTERIAL UNDYED BRAIDED (POLYGLACTIN 910), SYNTHETIC ABSORBABLE SUTURE: Brand: COATED VICRYL

## (undated) DEVICE — SYS CLS PORTSITE CT CLOSESURE 5AND10/12

## (undated) DEVICE — GLV SURG SENSICARE W/ALOE PF LF 7 STRL

## (undated) DEVICE — TISSUE RETRIEVAL SYSTEM: Brand: INZII RETRIEVAL SYSTEM

## (undated) DEVICE — SPNG LAP 18X18IN LF STRL PK/5

## (undated) DEVICE — ACCESS PLATFORM FOR MINIMALLY INVASIVE SURGERY.: Brand: GELPORT® LAPAROSCOPIC  SYSTEM

## (undated) DEVICE — ECHELON FLEX POWERED PLUS ARTICULATING ENDOSCOPIC LINEAR CUTTER , 60MM: Brand: ECHELON FLEX

## (undated) DEVICE — 3M™ STERI-DRAPE™ INSTRUMENT POUCH 1018: Brand: STERI-DRAPE™

## (undated) DEVICE — SUT PDS 1 TP1 48IN Z880G BX/12

## (undated) DEVICE — LAPAROSCOPIC DISSECTOR: Brand: DEROYAL